# Patient Record
Sex: MALE | Race: WHITE | NOT HISPANIC OR LATINO | Employment: OTHER | URBAN - METROPOLITAN AREA
[De-identification: names, ages, dates, MRNs, and addresses within clinical notes are randomized per-mention and may not be internally consistent; named-entity substitution may affect disease eponyms.]

---

## 2019-07-27 ENCOUNTER — HOSPITAL ENCOUNTER (OUTPATIENT)
Facility: HOSPITAL | Age: 69
Discharge: HOME | DRG: 493 | End: 2019-07-28
Attending: EMERGENCY MEDICINE | Admitting: INTERNAL MEDICINE
Payer: COMMERCIAL

## 2019-07-27 ENCOUNTER — ANESTHESIA (OUTPATIENT)
Dept: SURGERY | Facility: HOSPITAL | Age: 69
DRG: 493 | End: 2019-07-27
Payer: COMMERCIAL

## 2019-07-27 ENCOUNTER — ANESTHESIA EVENT (OUTPATIENT)
Dept: SURGERY | Facility: HOSPITAL | Age: 69
DRG: 493 | End: 2019-07-27
Payer: COMMERCIAL
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
## 2019-07-27 DIAGNOSIS — M25.529 ELBOW PAIN: ICD-10-CM

## 2019-07-27 DIAGNOSIS — R00.0 TACHYCARDIA: ICD-10-CM

## 2019-07-27 DIAGNOSIS — Z01.818 PREOPERATIVE CLEARANCE: ICD-10-CM

## 2019-07-27 DIAGNOSIS — S51.002A OPEN DISLOCATION OF LEFT ELBOW, INITIAL ENCOUNTER: Primary | ICD-10-CM

## 2019-07-27 DIAGNOSIS — Z95.0 PACEMAKER: ICD-10-CM

## 2019-07-27 DIAGNOSIS — S53.105A OPEN DISLOCATION OF LEFT ELBOW, INITIAL ENCOUNTER: Primary | ICD-10-CM

## 2019-07-27 PROBLEM — I10 ESSENTIAL HYPERTENSION: Status: ACTIVE | Noted: 2019-07-27

## 2019-07-27 PROBLEM — I25.10 CAD (CORONARY ARTERY DISEASE): Status: ACTIVE | Noted: 2019-07-27

## 2019-07-27 PROBLEM — I48.0 PAF (PAROXYSMAL ATRIAL FIBRILLATION): Status: ACTIVE | Noted: 2019-07-27

## 2019-07-27 LAB
ABO + RH BLD: NORMAL
ALBUMIN SERPL BCP-MCNC: 3.7 G/DL (ref 3.5–5.2)
ALP SERPL-CCNC: 47 U/L (ref 55–135)
ALT SERPL W/O P-5'-P-CCNC: 20 U/L (ref 10–44)
ANION GAP SERPL CALC-SCNC: 9 MMOL/L (ref 8–16)
APTT BLDCRRT: 27.2 SEC (ref 21–32)
AST SERPL-CCNC: 25 U/L (ref 10–40)
BASOPHILS # BLD AUTO: 0.03 K/UL (ref 0–0.2)
BASOPHILS NFR BLD: 0.4 % (ref 0–1.9)
BILIRUB SERPL-MCNC: 0.9 MG/DL (ref 0.1–1)
BLD GP AB SCN CELLS X3 SERPL QL: NORMAL
BUN SERPL-MCNC: 14 MG/DL (ref 8–23)
CALCIUM SERPL-MCNC: 9.3 MG/DL (ref 8.7–10.5)
CHLORIDE SERPL-SCNC: 106 MMOL/L (ref 95–110)
CO2 SERPL-SCNC: 26 MMOL/L (ref 23–29)
CREAT SERPL-MCNC: 1.1 MG/DL (ref 0.5–1.4)
DIFFERENTIAL METHOD: ABNORMAL
EOSINOPHIL # BLD AUTO: 0.1 K/UL (ref 0–0.5)
EOSINOPHIL NFR BLD: 1.5 % (ref 0–8)
ERYTHROCYTE [DISTWIDTH] IN BLOOD BY AUTOMATED COUNT: 13.7 % (ref 11.5–14.5)
EST. GFR  (AFRICAN AMERICAN): >60 ML/MIN/1.73 M^2
EST. GFR  (NON AFRICAN AMERICAN): >60 ML/MIN/1.73 M^2
GLUCOSE SERPL-MCNC: 124 MG/DL (ref 70–110)
HCT VFR BLD AUTO: 37.8 % (ref 40–54)
HCV AB SERPL QL IA: NEGATIVE
HGB BLD-MCNC: 12.7 G/DL (ref 14–18)
INR PPP: 1 (ref 0.8–1.2)
LYMPHOCYTES # BLD AUTO: 1.2 K/UL (ref 1–4.8)
LYMPHOCYTES NFR BLD: 14.2 % (ref 18–48)
MCH RBC QN AUTO: 31.1 PG (ref 27–31)
MCHC RBC AUTO-ENTMCNC: 33.6 G/DL (ref 32–36)
MCV RBC AUTO: 93 FL (ref 82–98)
MONOCYTES # BLD AUTO: 0.5 K/UL (ref 0.3–1)
MONOCYTES NFR BLD: 6.4 % (ref 4–15)
NEUTROPHILS # BLD AUTO: 6.3 K/UL (ref 1.8–7.7)
NEUTROPHILS NFR BLD: 77.7 % (ref 38–73)
PLATELET # BLD AUTO: 179 K/UL (ref 150–350)
PMV BLD AUTO: 10.9 FL (ref 9.2–12.9)
POTASSIUM SERPL-SCNC: 3.8 MMOL/L (ref 3.5–5.1)
PROT SERPL-MCNC: 6.2 G/DL (ref 6–8.4)
PROTHROMBIN TIME: 10.7 SEC (ref 9–12.5)
RBC # BLD AUTO: 4.08 M/UL (ref 4.6–6.2)
SODIUM SERPL-SCNC: 141 MMOL/L (ref 136–145)
WBC # BLD AUTO: 8.1 K/UL (ref 3.9–12.7)

## 2019-07-27 PROCEDURE — 86901 BLOOD TYPING SEROLOGIC RH(D): CPT

## 2019-07-27 PROCEDURE — 99285 EMERGENCY DEPT VISIT HI MDM: CPT | Mod: 25

## 2019-07-27 PROCEDURE — 93010 EKG 12-LEAD: ICD-10-PCS | Mod: ,,, | Performed by: INTERNAL MEDICINE

## 2019-07-27 PROCEDURE — 36415 COLL VENOUS BLD VENIPUNCTURE: CPT

## 2019-07-27 PROCEDURE — 25000003 PHARM REV CODE 250: Performed by: ORTHOPAEDIC SURGERY

## 2019-07-27 PROCEDURE — 85025 COMPLETE CBC W/AUTO DIFF WBC: CPT

## 2019-07-27 PROCEDURE — 93010 ELECTROCARDIOGRAM REPORT: CPT | Mod: ,,, | Performed by: INTERNAL MEDICINE

## 2019-07-27 PROCEDURE — 25000003 PHARM REV CODE 250: Performed by: ANESTHESIOLOGY

## 2019-07-27 PROCEDURE — 85730 THROMBOPLASTIN TIME PARTIAL: CPT

## 2019-07-27 PROCEDURE — 71000033 HC RECOVERY, INTIAL HOUR: Performed by: ORTHOPAEDIC SURGERY

## 2019-07-27 PROCEDURE — 96375 TX/PRO/DX INJ NEW DRUG ADDON: CPT

## 2019-07-27 PROCEDURE — 25000003 PHARM REV CODE 250: Performed by: NURSE PRACTITIONER

## 2019-07-27 PROCEDURE — 63600175 PHARM REV CODE 636 W HCPCS: Performed by: ORTHOPAEDIC SURGERY

## 2019-07-27 PROCEDURE — 63600175 PHARM REV CODE 636 W HCPCS: Performed by: NURSE ANESTHETIST, CERTIFIED REGISTERED

## 2019-07-27 PROCEDURE — 85610 PROTHROMBIN TIME: CPT

## 2019-07-27 PROCEDURE — 96374 THER/PROPH/DIAG INJ IV PUSH: CPT

## 2019-07-27 PROCEDURE — 63600175 PHARM REV CODE 636 W HCPCS: Performed by: EMERGENCY MEDICINE

## 2019-07-27 PROCEDURE — 80053 COMPREHEN METABOLIC PANEL: CPT

## 2019-07-27 PROCEDURE — 37000009 HC ANESTHESIA EA ADD 15 MINS: Performed by: ORTHOPAEDIC SURGERY

## 2019-07-27 PROCEDURE — 11000001 HC ACUTE MED/SURG PRIVATE ROOM

## 2019-07-27 PROCEDURE — 21400001 HC TELEMETRY ROOM

## 2019-07-27 PROCEDURE — 96376 TX/PRO/DX INJ SAME DRUG ADON: CPT

## 2019-07-27 PROCEDURE — 96372 THER/PROPH/DIAG INJ SC/IM: CPT | Mod: 59

## 2019-07-27 PROCEDURE — 37000008 HC ANESTHESIA 1ST 15 MINUTES: Performed by: ORTHOPAEDIC SURGERY

## 2019-07-27 PROCEDURE — 36000707: Performed by: ORTHOPAEDIC SURGERY

## 2019-07-27 PROCEDURE — 87040 BLOOD CULTURE FOR BACTERIA: CPT | Mod: 59

## 2019-07-27 PROCEDURE — 36000706: Performed by: ORTHOPAEDIC SURGERY

## 2019-07-27 PROCEDURE — 27201423 OPTIME MED/SURG SUP & DEVICES STERILE SUPPLY: Performed by: ORTHOPAEDIC SURGERY

## 2019-07-27 PROCEDURE — 93005 ELECTROCARDIOGRAM TRACING: CPT

## 2019-07-27 PROCEDURE — 25000003 PHARM REV CODE 250: Performed by: INTERNAL MEDICINE

## 2019-07-27 PROCEDURE — 63600175 PHARM REV CODE 636 W HCPCS: Performed by: ANESTHESIOLOGY

## 2019-07-27 PROCEDURE — 86803 HEPATITIS C AB TEST: CPT

## 2019-07-27 RX ORDER — POTASSIUM CHLORIDE 1.5 G/1.58G
20 POWDER, FOR SOLUTION ORAL ONCE
Status: ON HOLD | COMMUNITY
End: 2019-07-27

## 2019-07-27 RX ORDER — HYDROMORPHONE HYDROCHLORIDE 2 MG/ML
0.2 INJECTION, SOLUTION INTRAMUSCULAR; INTRAVENOUS; SUBCUTANEOUS EVERY 5 MIN PRN
Status: DISCONTINUED | OUTPATIENT
Start: 2019-07-27 | End: 2019-07-27 | Stop reason: HOSPADM

## 2019-07-27 RX ORDER — ONDANSETRON 2 MG/ML
4 INJECTION INTRAMUSCULAR; INTRAVENOUS EVERY 12 HOURS PRN
Status: DISCONTINUED | OUTPATIENT
Start: 2019-07-27 | End: 2019-07-28 | Stop reason: HOSPADM

## 2019-07-27 RX ORDER — FOLIC ACID 1 MG/1
1 TABLET ORAL DAILY
Status: DISCONTINUED | OUTPATIENT
Start: 2019-07-28 | End: 2019-07-28 | Stop reason: HOSPADM

## 2019-07-27 RX ORDER — FUROSEMIDE 40 MG/1
40 TABLET ORAL 2 TIMES DAILY
COMMUNITY

## 2019-07-27 RX ORDER — ASPIRIN 81 MG/1
81 TABLET ORAL DAILY
COMMUNITY

## 2019-07-27 RX ORDER — SODIUM CHLORIDE 0.9 % (FLUSH) 0.9 %
10 SYRINGE (ML) INJECTION
Status: DISCONTINUED | OUTPATIENT
Start: 2019-07-27 | End: 2019-07-27

## 2019-07-27 RX ORDER — FENTANYL CITRATE 50 UG/ML
25 INJECTION, SOLUTION INTRAMUSCULAR; INTRAVENOUS EVERY 5 MIN PRN
Status: DISCONTINUED | OUTPATIENT
Start: 2019-07-27 | End: 2019-07-27 | Stop reason: HOSPADM

## 2019-07-27 RX ORDER — ONDANSETRON 2 MG/ML
INJECTION INTRAMUSCULAR; INTRAVENOUS
Status: DISCONTINUED | OUTPATIENT
Start: 2019-07-27 | End: 2019-07-27

## 2019-07-27 RX ORDER — CARVEDILOL 12.5 MG/1
25 TABLET ORAL 2 TIMES DAILY WITH MEALS
Status: DISCONTINUED | OUTPATIENT
Start: 2019-07-27 | End: 2019-07-28 | Stop reason: HOSPADM

## 2019-07-27 RX ORDER — MEPERIDINE HYDROCHLORIDE 50 MG/ML
12.5 INJECTION INTRAMUSCULAR; INTRAVENOUS; SUBCUTANEOUS 2 TIMES DAILY PRN
Status: DISCONTINUED | OUTPATIENT
Start: 2019-07-27 | End: 2019-07-27 | Stop reason: HOSPADM

## 2019-07-27 RX ORDER — PRAVASTATIN SODIUM 20 MG/1
80 TABLET ORAL DAILY
Status: DISCONTINUED | OUTPATIENT
Start: 2019-07-28 | End: 2019-07-28 | Stop reason: HOSPADM

## 2019-07-27 RX ORDER — RAMIPRIL 10 MG/1
10 CAPSULE ORAL DAILY
Status: ON HOLD | COMMUNITY
End: 2023-08-02

## 2019-07-27 RX ORDER — GABAPENTIN 800 MG/1
800 TABLET ORAL 3 TIMES DAILY
COMMUNITY
Start: 2019-06-24

## 2019-07-27 RX ORDER — AMIODARONE HYDROCHLORIDE 200 MG/1
TABLET ORAL DAILY
COMMUNITY

## 2019-07-27 RX ORDER — TRAMADOL HYDROCHLORIDE 50 MG/1
50 TABLET ORAL EVERY 4 HOURS PRN
Status: DISCONTINUED | OUTPATIENT
Start: 2019-07-27 | End: 2019-07-28 | Stop reason: HOSPADM

## 2019-07-27 RX ORDER — FENTANYL CITRATE 50 UG/ML
50 INJECTION, SOLUTION INTRAMUSCULAR; INTRAVENOUS
Status: COMPLETED | OUTPATIENT
Start: 2019-07-27 | End: 2019-07-27

## 2019-07-27 RX ORDER — SODIUM CHLORIDE 0.9 % (FLUSH) 0.9 %
10 SYRINGE (ML) INJECTION
Status: DISCONTINUED | OUTPATIENT
Start: 2019-07-27 | End: 2019-07-28 | Stop reason: HOSPADM

## 2019-07-27 RX ORDER — AMIODARONE HYDROCHLORIDE 200 MG/1
200 TABLET ORAL DAILY
Status: DISCONTINUED | OUTPATIENT
Start: 2019-07-28 | End: 2019-07-27

## 2019-07-27 RX ORDER — CHLORHEXIDINE GLUCONATE ORAL RINSE 1.2 MG/ML
10 SOLUTION DENTAL
Status: DISCONTINUED | OUTPATIENT
Start: 2019-07-27 | End: 2019-07-27 | Stop reason: HOSPADM

## 2019-07-27 RX ORDER — PROPOFOL 10 MG/ML
INJECTION, EMULSION INTRAVENOUS
Status: DISCONTINUED | OUTPATIENT
Start: 2019-07-27 | End: 2019-07-27

## 2019-07-27 RX ORDER — RAMIPRIL 5 MG/1
10 CAPSULE ORAL DAILY
Status: DISCONTINUED | OUTPATIENT
Start: 2019-07-28 | End: 2019-07-28 | Stop reason: HOSPADM

## 2019-07-27 RX ORDER — PANTOPRAZOLE SODIUM 40 MG/1
40 TABLET, DELAYED RELEASE ORAL DAILY
COMMUNITY

## 2019-07-27 RX ORDER — FUROSEMIDE 40 MG/1
40 TABLET ORAL 2 TIMES DAILY
Status: DISCONTINUED | OUTPATIENT
Start: 2019-07-27 | End: 2019-07-28 | Stop reason: HOSPADM

## 2019-07-27 RX ORDER — CARVEDILOL 25 MG/1
25 TABLET ORAL 2 TIMES DAILY WITH MEALS
COMMUNITY

## 2019-07-27 RX ORDER — FOLIC ACID 1 MG/1
1 TABLET ORAL DAILY
COMMUNITY

## 2019-07-27 RX ORDER — DIPHENHYDRAMINE HYDROCHLORIDE 50 MG/ML
25 INJECTION INTRAMUSCULAR; INTRAVENOUS EVERY 6 HOURS PRN
Status: DISCONTINUED | OUTPATIENT
Start: 2019-07-27 | End: 2019-07-27 | Stop reason: HOSPADM

## 2019-07-27 RX ORDER — AMIODARONE HYDROCHLORIDE 200 MG/1
200 TABLET ORAL NIGHTLY
Status: DISCONTINUED | OUTPATIENT
Start: 2019-07-27 | End: 2019-07-28 | Stop reason: HOSPADM

## 2019-07-27 RX ORDER — CHLORHEXIDINE GLUCONATE ORAL RINSE 1.2 MG/ML
10 SOLUTION DENTAL 2 TIMES DAILY
Status: DISCONTINUED | OUTPATIENT
Start: 2019-07-27 | End: 2019-07-28 | Stop reason: HOSPADM

## 2019-07-27 RX ORDER — HYDROCODONE BITARTRATE AND ACETAMINOPHEN 5; 325 MG/1; MG/1
1 TABLET ORAL EVERY 4 HOURS PRN
Status: DISCONTINUED | OUTPATIENT
Start: 2019-07-27 | End: 2019-07-28 | Stop reason: HOSPADM

## 2019-07-27 RX ORDER — OXYCODONE HYDROCHLORIDE 5 MG/1
10 TABLET ORAL EVERY 4 HOURS PRN
Status: DISCONTINUED | OUTPATIENT
Start: 2019-07-27 | End: 2019-07-28 | Stop reason: HOSPADM

## 2019-07-27 RX ORDER — PRAVASTATIN SODIUM 80 MG/1
80 TABLET ORAL DAILY
COMMUNITY

## 2019-07-27 RX ORDER — ASPIRIN 81 MG/1
81 TABLET ORAL DAILY
Status: DISCONTINUED | OUTPATIENT
Start: 2019-07-28 | End: 2019-07-28 | Stop reason: HOSPADM

## 2019-07-27 RX ORDER — SODIUM CHLORIDE 9 MG/ML
INJECTION, SOLUTION INTRAVENOUS CONTINUOUS
Status: DISCONTINUED | OUTPATIENT
Start: 2019-07-27 | End: 2019-07-28 | Stop reason: HOSPADM

## 2019-07-27 RX ORDER — FENTANYL CITRATE 50 UG/ML
75 INJECTION, SOLUTION INTRAMUSCULAR; INTRAVENOUS ONCE
Status: COMPLETED | OUTPATIENT
Start: 2019-07-27 | End: 2019-07-27

## 2019-07-27 RX ORDER — PANTOPRAZOLE SODIUM 40 MG/1
40 TABLET, DELAYED RELEASE ORAL DAILY
Status: DISCONTINUED | OUTPATIENT
Start: 2019-07-28 | End: 2019-07-28 | Stop reason: HOSPADM

## 2019-07-27 RX ORDER — FENTANYL CITRATE 50 UG/ML
75 INJECTION, SOLUTION INTRAMUSCULAR; INTRAVENOUS
Status: COMPLETED | OUTPATIENT
Start: 2019-07-27 | End: 2019-07-27

## 2019-07-27 RX ORDER — OXYCODONE AND ACETAMINOPHEN 5; 325 MG/1; MG/1
1 TABLET ORAL
Status: DISCONTINUED | OUTPATIENT
Start: 2019-07-27 | End: 2019-07-27 | Stop reason: HOSPADM

## 2019-07-27 RX ORDER — NEOMYCIN AND POLYMYXIN B SULFATES 40; 200000 MG/ML; [USP'U]/ML
SOLUTION IRRIGATION
Status: DISCONTINUED | OUTPATIENT
Start: 2019-07-27 | End: 2019-07-27 | Stop reason: HOSPADM

## 2019-07-27 RX ORDER — GABAPENTIN 400 MG/1
800 CAPSULE ORAL 3 TIMES DAILY
Status: DISCONTINUED | OUTPATIENT
Start: 2019-07-27 | End: 2019-07-28 | Stop reason: HOSPADM

## 2019-07-27 RX ORDER — CEFAZOLIN SODIUM 1 G/3ML
1 INJECTION, POWDER, FOR SOLUTION INTRAMUSCULAR; INTRAVENOUS
Status: COMPLETED | OUTPATIENT
Start: 2019-07-27 | End: 2019-07-27

## 2019-07-27 RX ORDER — FENTANYL CITRATE 50 UG/ML
INJECTION, SOLUTION INTRAMUSCULAR; INTRAVENOUS
Status: DISCONTINUED | OUTPATIENT
Start: 2019-07-27 | End: 2019-07-27

## 2019-07-27 RX ORDER — POTASSIUM CHLORIDE 20 MEQ/1
20 TABLET, EXTENDED RELEASE ORAL DAILY
Status: DISCONTINUED | OUTPATIENT
Start: 2019-07-27 | End: 2019-07-28 | Stop reason: HOSPADM

## 2019-07-27 RX ORDER — SODIUM CHLORIDE, SODIUM LACTATE, POTASSIUM CHLORIDE, CALCIUM CHLORIDE 600; 310; 30; 20 MG/100ML; MG/100ML; MG/100ML; MG/100ML
INJECTION, SOLUTION INTRAVENOUS CONTINUOUS PRN
Status: DISCONTINUED | OUTPATIENT
Start: 2019-07-27 | End: 2019-07-27

## 2019-07-27 RX ORDER — ONDANSETRON 2 MG/ML
4 INJECTION INTRAMUSCULAR; INTRAVENOUS ONCE
Status: COMPLETED | OUTPATIENT
Start: 2019-07-27 | End: 2019-07-27

## 2019-07-27 RX ADMIN — FENTANYL CITRATE 150 MCG: 50 INJECTION, SOLUTION INTRAMUSCULAR; INTRAVENOUS at 02:07

## 2019-07-27 RX ADMIN — OXYCODONE HYDROCHLORIDE 10 MG: 5 TABLET ORAL at 08:07

## 2019-07-27 RX ADMIN — ONDANSETRON 4 MG: 2 INJECTION INTRAMUSCULAR; INTRAVENOUS at 11:07

## 2019-07-27 RX ADMIN — FUROSEMIDE 40 MG: 40 TABLET ORAL at 06:07

## 2019-07-27 RX ADMIN — HYDROMORPHONE HYDROCHLORIDE 0.2 MG: 2 INJECTION, SOLUTION INTRAMUSCULAR; INTRAVENOUS; SUBCUTANEOUS at 04:07

## 2019-07-27 RX ADMIN — FENTANYL CITRATE 100 MCG: 50 INJECTION, SOLUTION INTRAMUSCULAR; INTRAVENOUS at 02:07

## 2019-07-27 RX ADMIN — POTASSIUM CHLORIDE 20 MEQ: 1500 TABLET, EXTENDED RELEASE ORAL at 06:07

## 2019-07-27 RX ADMIN — ONDANSETRON 4 MG: 2 INJECTION, SOLUTION INTRAMUSCULAR; INTRAVENOUS at 02:07

## 2019-07-27 RX ADMIN — GABAPENTIN 800 MG: 400 CAPSULE ORAL at 08:07

## 2019-07-27 RX ADMIN — OXYCODONE AND ACETAMINOPHEN 1 TABLET: 5; 325 TABLET ORAL at 04:07

## 2019-07-27 RX ADMIN — PROPOFOL 120 MG: 10 INJECTION, EMULSION INTRAVENOUS at 02:07

## 2019-07-27 RX ADMIN — CEFAZOLIN 1 G: 1 INJECTION, POWDER, FOR SOLUTION INTRAMUSCULAR; INTRAVENOUS at 12:07

## 2019-07-27 RX ADMIN — FENTANYL CITRATE 75 MCG: 0.05 INJECTION, SOLUTION INTRAMUSCULAR; INTRAVENOUS at 12:07

## 2019-07-27 RX ADMIN — CARVEDILOL 25 MG: 12.5 TABLET, FILM COATED ORAL at 06:07

## 2019-07-27 RX ADMIN — AMIODARONE HYDROCHLORIDE 200 MG: 200 TABLET ORAL at 06:07

## 2019-07-27 RX ADMIN — CHLORHEXIDINE GLUCONATE 0.12% ORAL RINSE 10 ML: 1.2 LIQUID ORAL at 08:07

## 2019-07-27 RX ADMIN — FENTANYL CITRATE 50 MCG: 0.05 INJECTION, SOLUTION INTRAMUSCULAR; INTRAVENOUS at 11:07

## 2019-07-27 RX ADMIN — SODIUM CHLORIDE, SODIUM LACTATE, POTASSIUM CHLORIDE, AND CALCIUM CHLORIDE: 600; 310; 30; 20 INJECTION, SOLUTION INTRAVENOUS at 03:07

## 2019-07-27 RX ADMIN — FENTANYL CITRATE 75 MCG: 0.05 INJECTION, SOLUTION INTRAMUSCULAR; INTRAVENOUS at 01:07

## 2019-07-27 RX ADMIN — SODIUM CHLORIDE: 0.9 INJECTION, SOLUTION INTRAVENOUS at 05:07

## 2019-07-27 RX ADMIN — SODIUM CHLORIDE, SODIUM LACTATE, POTASSIUM CHLORIDE, AND CALCIUM CHLORIDE: 600; 310; 30; 20 INJECTION, SOLUTION INTRAVENOUS at 02:07

## 2019-07-27 NOTE — SUBJECTIVE & OBJECTIVE
Past Medical History:   Diagnosis Date    Crohn's disease     Hypertension     Sarcoidosis        Past Surgical History:   Procedure Laterality Date    CARDIAC SURGERY         Review of patient's allergies indicates:   Allergen Reactions    Infliximab Anaphylaxis    Iodine and iodide containing products Rash     Patient states severe rash similar to a sunburn experienced after CT contrast. Patient with prior history of pre-meds before any contrast studies.      Mercaptopurine analogues (thiopurines)        No current facility-administered medications for this encounter.      Current Outpatient Medications   Medication Sig    amiodarone (PACERONE) 200 MG Tab Take by mouth once daily.    apixaban (ELIQUIS) 5 mg Tab Take 5 mg by mouth 2 (two) times daily.    aspirin (ECOTRIN) 81 MG EC tablet Take 81 mg by mouth once daily.    carvedilol (COREG) 25 MG tablet Take 25 mg by mouth 2 (two) times daily with meals.    folic acid (FOLVITE) 1 MG tablet Take 1 mg by mouth once daily.    furosemide (LASIX) 40 MG tablet Take 40 mg by mouth 2 (two) times daily.    gabapentin (NEURONTIN) 800 MG tablet Take 800 mg by mouth 3 (three) times daily.    pantoprazole (PROTONIX) 40 MG tablet Take 40 mg by mouth once daily.    potassium chloride (KLOR-CON) 20 mEq Pack Take 20 mEq by mouth once.    pravastatin (PRAVACHOL) 80 MG tablet Take 80 mg by mouth once daily.    ramipril (ALTACE) 10 MG capsule Take 10 mg by mouth once daily.    vitamin B comp and vit C no.6 (VITAMIN B COMP WITH VIT C NO.6 ORAL) Take by mouth.     Family History     None        Tobacco Use    Smoking status: Former Smoker    Smokeless tobacco: Never Used   Substance and Sexual Activity    Alcohol use: Not Currently    Drug use: Never    Sexual activity: Not on file     Review of Systems   Constitution: Negative.   HENT: Negative.    Eyes: Negative.    Cardiovascular: Positive for irregular heartbeat.        Patient has a history of arrhythmia and  a pacemaker.   Respiratory: Negative.    Endocrine: Negative.    Hematologic/Lymphatic: Negative.         Patient is on anticoagulation.   Skin: Negative.    Musculoskeletal:        Negative prior to today's injury.   Gastrointestinal: Negative.    Neurological: Negative.    Psychiatric/Behavioral: Negative.    Allergic/Immunologic: Negative.      Objective:     Vital Signs (Most Recent):  Temp: 97.7 °F (36.5 °C) (07/27/19 1108)  Pulse: 67 (07/27/19 1246)  Resp: 16 (07/27/19 1150)  BP: 137/77 (07/27/19 1246)  SpO2: 99 % (07/27/19 1150) Vital Signs (24h Range):  Temp:  [97.7 °F (36.5 °C)] 97.7 °F (36.5 °C)  Pulse:  [61-67] 67  Resp:  [15-16] 16  SpO2:  [99 %] 99 %  BP: (118-137)/(69-90) 137/77     Weight: 95.3 kg (210 lb)  Height: 6' (182.9 cm)  Body mass index is 28.48 kg/m².    No intake or output data in the 24 hours ending 07/27/19 1314    General    Nursing note and vitals reviewed.  Constitutional: He is oriented to person, place, and time. He appears well-developed and well-nourished. No distress.   HENT:   Head: Normocephalic and atraumatic.   Eyes: EOM are normal. Pupils are equal, round, and reactive to light.   Neck: Normal range of motion. No tracheal deviation present.   Cardiovascular: Normal rate and intact distal pulses.    Pulmonary/Chest: Effort normal. No respiratory distress. He exhibits no tenderness.   Abdominal: Soft. He exhibits no distension. There is no tenderness. There is no rebound.   Neurological: He is alert and oriented to person, place, and time.   Psychiatric: He has a normal mood and affect. His behavior is normal. Judgment and thought content normal.         Left Hand/Wrist Exam     Other     Sensory Exam  Median Distribution: normal  Ulnar Distribution: normal  Radial Distribution: normal      Right Elbow Exam   Right elbow exam is normal.      Left Elbow Exam     Inspection   Scars: absent  Bruising: present  Deformity: present  Atrophy: absent    Pain   The patient exhibits pain  of the anterior joint line, lateral epicondyle, medial epicondyle and olecranon    Tenderness   The patient is tender to palpation of the lateral epicondyle, medial epicondyle, olecranon fossa, radial head and radial capitellar joint.     Range of Motion   Extension: abnormal   Flexion: abnormal   Pronation: abnormal   Supination: abnormal     Other   Sensation: normal    Comments:  Gross deformity with a medial laceration of the left elbow is noted at this time. Exposed osseous structures are also noted.  The neurologic exam of the left upper extremity demonstrates that the median, radial and ulnar nerve remained intact.  The posterior and anterior interosseous nerves also appear intact. Pulses are brisk and capillary refill is normal.        Muscle Strength   Left Upper Extremity  Wrist extension: 5/5/5   Wrist flexion: 5/5/5   :  4/5/5   Pinch Mechanism: 5/5  Intrinsics: 5/5  EPL (Extensor Pollicis Longus): 5/5    Vascular Exam       Left Pulses      Radial:                    2+      Capillary Refill  Left Hand: normal capillary refill    Edema  Left Forearm: absent      Significant Labs:   CBC:   Recent Labs   Lab 07/27/19  1156   WBC 8.10   HGB 12.7*   HCT 37.8*        All pertinent labs within the past 24 hours have been reviewed.    Significant Imaging: X-Ray: I have reviewed all pertinent results/findings and my personal findings are:  Dislocation of the left elbow with possible radial ulnar injury as well.

## 2019-07-27 NOTE — HPI
This is a 68-year-old male who sustained a fall today injuring his left upper extremity.  He was evaluated in the emergency room for extreme left elbow pain.  It is unremitting.  There is an open wound with active bleeding.  The patient has been stabilized in the emergency room and preliminary x-rays demonstrate a dislocation of his left elbow.  Due to the open wound and exposed bone this is an open dislocation possibly an open fracture dislocation.  The patient has been evaluated in the emergency room by the emergency room physician and myself and the urgent nature of the injury has been explained.  Patient is recommended operative intervention.

## 2019-07-27 NOTE — H&P
[facility] Hospital Medicine  History & Physical    Patient Name: Memo Lew  MRN: [id number]  Admission Date: 7/27/2019  Attending Physician: Odalis Jonas MD   Primary Care Provider: SAJAN Santana       Patient information was obtained from past medical records and ER records.     Subjective:     Principal Problem:Open dislocation of left elbow    Chief Complaint:   Chief Complaint   Patient presents with    Fall     fell from standing level, L open elbow fx, wrist rotated, squirting blood when sitting up, pt on Plavix        HPI: Memo Lew is a 68 year old male with history of essential hypertension, sarcoidosis, and crohn's disease who presented to ED with left elbow open fracture following a mechanical fall. He emergently went surgery for I&D, elbow ORIF, and ulnar nerve decompression performed by Dr. Clark. Surgery without acute complication. Hospital medicine has been consulted for admission. Patient is awake, but groggy. Complains of left arm heaviness postoperatively. Currently on supplemental oxygen. Oxygen saturation is stable.       Past Medical History:   Diagnosis Date    Crohn's disease     Hypertension     Sarcoidosis        Past Surgical History:   Procedure Laterality Date    CARDIAC PACEMAKER PLACEMENT      CARDIAC SURGERY         Review of patient's allergies indicates:   Allergen Reactions    Infliximab Anaphylaxis    Iodine and iodide containing products Rash     Patient states severe rash similar to a sunburn experienced after CT contrast. Patient with prior history of pre-meds before any contrast studies.      Mercaptopurine analogues (thiopurines)        No current facility-administered medications on file prior to encounter.      Current Outpatient Medications on File Prior to Encounter   Medication Sig    amiodarone (PACERONE) 200 MG Tab Take by mouth once daily.    apixaban (ELIQUIS) 5 mg Tab Take 5 mg by mouth 2 (two) times daily.    aspirin  (ECOTRIN) 81 MG EC tablet Take 81 mg by mouth once daily.    carvedilol (COREG) 25 MG tablet Take 25 mg by mouth 2 (two) times daily with meals.    folic acid (FOLVITE) 1 MG tablet Take 1 mg by mouth once daily.    furosemide (LASIX) 40 MG tablet Take 40 mg by mouth 2 (two) times daily.    gabapentin (NEURONTIN) 800 MG tablet Take 800 mg by mouth 3 (three) times daily.    pantoprazole (PROTONIX) 40 MG tablet Take 40 mg by mouth once daily.    pravastatin (PRAVACHOL) 80 MG tablet Take 80 mg by mouth once daily.    ramipril (ALTACE) 10 MG capsule Take 10 mg by mouth once daily.    vitamin B comp and vit C no.6 (VITAMIN B COMP WITH VIT C NO.6 ORAL) Take by mouth.    [DISCONTINUED] potassium chloride (KLOR-CON) 20 mEq Pack Take 20 mEq by mouth once.     Family History     None        Tobacco Use    Smoking status: Former Smoker    Smokeless tobacco: Never Used   Substance and Sexual Activity    Alcohol use: Not Currently    Drug use: Never    Sexual activity: Not on file     Review of Systems   Unable to perform ROS: Other   s/p surgery  Objective:     Vital Signs (Most Recent):  Temp: 97.2 °F (36.2 °C) (07/27/19 1608)  Pulse: 61 (07/27/19 1630)  Resp: 18 (07/27/19 1630)  BP: (!) 170/92 (07/27/19 1630)  SpO2: 100 % (07/27/19 1630) Vital Signs (24h Range):  Temp:  [97.2 °F (36.2 °C)-97.7 °F (36.5 °C)] 97.2 °F (36.2 °C)  Pulse:  [60-67] 61  Resp:  [15-22] 18  SpO2:  [95 %-100 %] 100 %  BP: (118-170)/(69-92) 170/92     Weight: 95.3 kg (210 lb)  Body mass index is 28.48 kg/m².    Physical Exam   Constitutional: He is oriented to person, place, and time. He appears well-developed and well-nourished. No distress.   HENT:   Head: Normocephalic and atraumatic.   Eyes: EOM are normal.   Neck: Normal range of motion. Neck supple.   Cardiovascular: Normal rate, regular rhythm and normal heart sounds.   Pulmonary/Chest: Effort normal and breath sounds normal. No respiratory distress.   Abdominal: Soft. Bowel sounds  are normal. He exhibits no distension. There is no tenderness.   Musculoskeletal: Normal range of motion. He exhibits no edema.   Left arm in ACE wrap and sling. Able to open and close fingers on command.    Neurological: He is alert and oriented to person, place, and time.   Groggy postoperatively   Skin: Skin is dry.   Nursing note and vitals reviewed.        CRANIAL NERVES     CN III, IV, VI   Extraocular motions are normal.        Significant Labs:   CBC:   Recent Labs   Lab 07/27/19  1156   WBC 8.10   HGB 12.7*   HCT 37.8*        CMP:   Recent Labs   Lab 07/27/19  1156      K 3.8      CO2 26   *   BUN 14   CREATININE 1.1   CALCIUM 9.3   PROT 6.2   ALBUMIN 3.7   BILITOT 0.9   ALKPHOS 47*   AST 25   ALT 20   ANIONGAP 9   EGFRNONAA >60       Significant Imaging:   Imaging Results          X-Ray Chest AP Portable (Final result)  Result time 07/27/19 13:54:04    Final result by James Lubin MD (07/27/19 13:54:04)                 Impression:      1.  Minimal scarring or atelectasis in the left mid lung laterally.  Lungs are otherwise clear.    2.  Incidental findings as noted above.      Electronically signed by: James Lubin MD  Date:    07/27/2019  Time:    13:54             Narrative:    EXAMINATION:  XR CHEST AP PORTABLE    CLINICAL HISTORY:  Encounter for other preprocedural examination    COMPARISON:  No comparison studies are available.    FINDINGS:  The study is lordotic in position and slightly rotated to the left.  Minimal scarring or atelectasis in the lateral left mid lung.  The lungs are otherwise clear.  The cardiac silhouette size is normal. The trachea is midline and the mediastinal width is normal. Negative for focal infiltrate, effusion or pneumothorax. Pulmonary vasculature is normal. Negative for osseous abnormalities. Dual lead left subclavian pacemaker.  Median sternotomy wires and CABG changes.  Convex right curvature of the thoracic spine with marginal  spondylosis.  Tortuous aorta with calcifications of the aortic knob.                               CT Head Without Contrast (Final result)  Result time 07/27/19 13:33:01    Final result by Cheikh Walsh MD (07/27/19 13:33:01)                 Impression:      Atrophy.  Intracranial atherosclerotic calcifications.  Small vessel disease.  No definite acute intracranial abnormality.  Artifacts are present from the patient's arm position..    All CT scans at this facility use dose modulation, iterative reconstruction and/or weight based dosing when appropriate to reduce radiation dose to as low as reasonably achievable.      Electronically signed by: Cheikh Walsh MD  Date:    07/27/2019  Time:    13:33             Narrative:    EXAMINATION:  CT HEAD WITHOUT CONTRAST    CLINICAL HISTORY:  Unspecified fall, initial encounterHead trauma, minor, GCS>=13, NOC/NEXUS/CCR positive, first study;    TECHNIQUE:  Axial CT images of the head were obtained without  contrast.    FINDINGS:  Mild diffuse cerebral and cerebellar atrophy.  Intracranial atherosclerotic calcifications.  Retention cyst or polyp in the sphenoid sinus.  No acute intracranial abnormality identified...  No intra or extraaxial masses, hemorrhages, abnormal fluid collections or abnormal calcifications..                               X-Ray Elbow 2 Views Left (Final result)  Result time 07/27/19 13:11:52   Procedure changed from X-Ray Elbow Complete Left     Final result by Cheikh Walsh MD (07/27/19 13:11:52)                 Impression:      Fracture dislocation of the elbow.  No change in position or alignment      Electronically signed by: Cheikh Walsh MD  Date:    07/27/2019  Time:    13:11             Narrative:    EXAMINATION:  XR ELBOW 2 VIEWS LEFT    CLINICAL HISTORY:  fall; Pain in unspecified elbow    TECHNIQUE:  AP, lateral, and oblique views of the left elbow were performed.    COMPARISON:  None    FINDINGS:  Fracture dislocation of the elbow.                                X-Ray Humerus 2 View Left (Final result)  Result time 07/27/19 13:11:24    Final result by Cheikh Walsh MD (07/27/19 13:11:24)                 Impression:      Fracture dislocation of the elbow joint.  No change in position or alignment when compared to the prior film      Electronically signed by: Cheikh Walsh MD  Date:    07/27/2019  Time:    13:11             Narrative:    EXAMINATION:  XR HUMERUS 2 VIEW LEFT    COMPARISON:  None    FINDINGS:  Fracture dislocation of the elbow.                                  Assessment/Plan:     * Open dislocation of left elbow  -S/p I&D, ORIF elbow, and ulnar nerve decompression performed by Dr. Clark  -continue IV abx x 24 hours  --PT/OT in AM        Essential hypertension  --resume home medications      CAD (coronary artery disease)  S/p CAD 9/2018  -resume ASA, BB, ACE, STATIN    PAF (paroxysmal atrial fibrillation)  --continue BB, Amiodarone,   --will resume Eliquis in AM        VTE Risk Mitigation (From admission, onward)        Ordered     IP VTE HIGH RISK PATIENT  Once      07/27/19 1501     Place sequential compression device  Until discontinued      07/27/19 1501     Place MARIA FERNANDA hose  Until discontinued      07/27/19 1501     Reason for No Pharmacological VTE Prophylaxis  Once      07/27/19 1501          Delaney Tucker NP  Department of Hospital Medicine   [facility]

## 2019-07-27 NOTE — ED NOTES
Pt AAOx3, resting in bed, side rails up x 2, call bell within reach. Pt c/o left elbow pain - 10/10. MD notified. Will continue to monitor.

## 2019-07-27 NOTE — TRANSFER OF CARE
Anesthesia Transfer of Care Note    Patient: Memo Lew    Procedure(s) Performed: Procedure(s) (LRB):  INCISION AND DRAINAGE (Left)  ORIF, ELBOW (Left)  DECOMPRESSION, NERVE, ULNAR (Left)    Patient location: PACU    Anesthesia Type: general    Transport from OR: Transported from OR on room air with adequate spontaneous ventilation    Post pain: adequate analgesia    Post assessment: no apparent anesthetic complications    Post vital signs: stable    Level of consciousness: awake    Nausea/Vomiting: no nausea/vomiting    Complications: none    Transfer of care protocol was followed      Last vitals:   Visit Vitals  /77 (BP Location: Right arm, Patient Position: Lying)   Pulse 67   Temp 36.5 °C (97.7 °F) (Oral)   Resp 16   Ht 6' (1.829 m)   Wt 95.3 kg (210 lb)   SpO2 99%   BMI 28.48 kg/m²

## 2019-07-27 NOTE — ED NOTES
"Pt c/o fall and left elbow pain - onset today. Pain rated 10/10. Patient states, "I tripped while changing my bird feeder." Denies hitting head or LOC.     Patient moved to ED room 6, patient assisted onto stretcher and changed into a gown. Patient placed on automatic blood pressure cuff. Bed placed in low locked position, side rails up x 2, call light is within reach of patient or family, orientation to room and explanation of wait provided to family, awaiting MD evaluation and orders, will continue to monitor.    Patient identifies self as Memo D Wascom      LOC: The patient is awake, alert and aware of environment with an appropriate affect, the patient is oriented x 3 and speaking appropriately.  APPEARANCE: Patient appears distressed related to pain, patient is clean and well groomed, patient's clothing is properly fastened.  SKIN: The skin is warm and dry, color consistent with ethnicity, patient has normal skin turgor and moist mucus membranes, left humerus erupted through skin - swelling and small amount of bleeding noted.   MUSCULOSKELETAL: Deformity of LUE - mobility impaired, moving all other extremities spontaneously.  RESPIRATORY: Airway is open and patent, respirations are spontaneous, patient has a normal effort and rate, no accessory muscle use noted.  CARDIAC: Patient has a normal rate and rhythm, no periphreal edema noted, capillary refill < 3 seconds, 2+ bilateral radial pulses present.  ABDOMEN: Soft and non tender to palpation, no distention noted.  NEUROLOGIC: PERRL, eyes open spontaneously, behavior appropriate to situation, follows commands, facial expression symmetrical, purposeful motor response noted, normal sensation in all extremities when touched with a finger.      "

## 2019-07-27 NOTE — SUBJECTIVE & OBJECTIVE
Past Medical History:   Diagnosis Date    Crohn's disease     Hypertension     Sarcoidosis        Past Surgical History:   Procedure Laterality Date    CARDIAC PACEMAKER PLACEMENT      CARDIAC SURGERY         Review of patient's allergies indicates:   Allergen Reactions    Infliximab Anaphylaxis    Iodine and iodide containing products Rash     Patient states severe rash similar to a sunburn experienced after CT contrast. Patient with prior history of pre-meds before any contrast studies.      Mercaptopurine analogues (thiopurines)        No current facility-administered medications on file prior to encounter.      Current Outpatient Medications on File Prior to Encounter   Medication Sig    amiodarone (PACERONE) 200 MG Tab Take by mouth once daily.    apixaban (ELIQUIS) 5 mg Tab Take 5 mg by mouth 2 (two) times daily.    aspirin (ECOTRIN) 81 MG EC tablet Take 81 mg by mouth once daily.    carvedilol (COREG) 25 MG tablet Take 25 mg by mouth 2 (two) times daily with meals.    folic acid (FOLVITE) 1 MG tablet Take 1 mg by mouth once daily.    furosemide (LASIX) 40 MG tablet Take 40 mg by mouth 2 (two) times daily.    gabapentin (NEURONTIN) 800 MG tablet Take 800 mg by mouth 3 (three) times daily.    pantoprazole (PROTONIX) 40 MG tablet Take 40 mg by mouth once daily.    pravastatin (PRAVACHOL) 80 MG tablet Take 80 mg by mouth once daily.    ramipril (ALTACE) 10 MG capsule Take 10 mg by mouth once daily.    vitamin B comp and vit C no.6 (VITAMIN B COMP WITH VIT C NO.6 ORAL) Take by mouth.    [DISCONTINUED] potassium chloride (KLOR-CON) 20 mEq Pack Take 20 mEq by mouth once.     Family History     None        Tobacco Use    Smoking status: Former Smoker    Smokeless tobacco: Never Used   Substance and Sexual Activity    Alcohol use: Not Currently    Drug use: Never    Sexual activity: Not on file     Review of Systems   Unable to perform ROS: Other   s/p surgery  Objective:     Vital Signs (Most  Recent):  Temp: 97.2 °F (36.2 °C) (07/27/19 1608)  Pulse: 61 (07/27/19 1630)  Resp: 18 (07/27/19 1630)  BP: (!) 170/92 (07/27/19 1630)  SpO2: 100 % (07/27/19 1630) Vital Signs (24h Range):  Temp:  [97.2 °F (36.2 °C)-97.7 °F (36.5 °C)] 97.2 °F (36.2 °C)  Pulse:  [60-67] 61  Resp:  [15-22] 18  SpO2:  [95 %-100 %] 100 %  BP: (118-170)/(69-92) 170/92     Weight: 95.3 kg (210 lb)  Body mass index is 28.48 kg/m².    Physical Exam   Constitutional: He is oriented to person, place, and time. He appears well-developed and well-nourished. No distress.   HENT:   Head: Normocephalic and atraumatic.   Eyes: EOM are normal.   Neck: Normal range of motion. Neck supple.   Cardiovascular: Normal rate, regular rhythm and normal heart sounds.   Pulmonary/Chest: Effort normal and breath sounds normal. No respiratory distress.   Abdominal: Soft. Bowel sounds are normal. He exhibits no distension. There is no tenderness.   Musculoskeletal: Normal range of motion. He exhibits no edema.   Left arm in ACE wrap and sling. Able to open and close fingers on command.    Neurological: He is alert and oriented to person, place, and time.   Groggy postoperatively   Skin: Skin is dry.   Nursing note and vitals reviewed.        CRANIAL NERVES     CN III, IV, VI   Extraocular motions are normal.        Significant Labs:   CBC:   Recent Labs   Lab 07/27/19  1156   WBC 8.10   HGB 12.7*   HCT 37.8*        CMP:   Recent Labs   Lab 07/27/19  1156      K 3.8      CO2 26   *   BUN 14   CREATININE 1.1   CALCIUM 9.3   PROT 6.2   ALBUMIN 3.7   BILITOT 0.9   ALKPHOS 47*   AST 25   ALT 20   ANIONGAP 9   EGFRNONAA >60       Significant Imaging:   Imaging Results          X-Ray Chest AP Portable (Final result)  Result time 07/27/19 13:54:04    Final result by James Lubin MD (07/27/19 13:54:04)                 Impression:      1.  Minimal scarring or atelectasis in the left mid lung laterally.  Lungs are otherwise clear.    2.   Incidental findings as noted above.      Electronically signed by: James Lubin MD  Date:    07/27/2019  Time:    13:54             Narrative:    EXAMINATION:  XR CHEST AP PORTABLE    CLINICAL HISTORY:  Encounter for other preprocedural examination    COMPARISON:  No comparison studies are available.    FINDINGS:  The study is lordotic in position and slightly rotated to the left.  Minimal scarring or atelectasis in the lateral left mid lung.  The lungs are otherwise clear.  The cardiac silhouette size is normal. The trachea is midline and the mediastinal width is normal. Negative for focal infiltrate, effusion or pneumothorax. Pulmonary vasculature is normal. Negative for osseous abnormalities. Dual lead left subclavian pacemaker.  Median sternotomy wires and CABG changes.  Convex right curvature of the thoracic spine with marginal spondylosis.  Tortuous aorta with calcifications of the aortic knob.                               CT Head Without Contrast (Final result)  Result time 07/27/19 13:33:01    Final result by Cheikh Walsh MD (07/27/19 13:33:01)                 Impression:      Atrophy.  Intracranial atherosclerotic calcifications.  Small vessel disease.  No definite acute intracranial abnormality.  Artifacts are present from the patient's arm position..    All CT scans at this facility use dose modulation, iterative reconstruction and/or weight based dosing when appropriate to reduce radiation dose to as low as reasonably achievable.      Electronically signed by: Cheikh Walsh MD  Date:    07/27/2019  Time:    13:33             Narrative:    EXAMINATION:  CT HEAD WITHOUT CONTRAST    CLINICAL HISTORY:  Unspecified fall, initial encounterHead trauma, minor, GCS>=13, NOC/NEXUS/CCR positive, first study;    TECHNIQUE:  Axial CT images of the head were obtained without  contrast.    FINDINGS:  Mild diffuse cerebral and cerebellar atrophy.  Intracranial atherosclerotic calcifications.  Retention cyst or polyp  in the sphenoid sinus.  No acute intracranial abnormality identified...  No intra or extraaxial masses, hemorrhages, abnormal fluid collections or abnormal calcifications..                               X-Ray Elbow 2 Views Left (Final result)  Result time 07/27/19 13:11:52   Procedure changed from X-Ray Elbow Complete Left     Final result by Cheikh Walsh MD (07/27/19 13:11:52)                 Impression:      Fracture dislocation of the elbow.  No change in position or alignment      Electronically signed by: Cheikh Walsh MD  Date:    07/27/2019  Time:    13:11             Narrative:    EXAMINATION:  XR ELBOW 2 VIEWS LEFT    CLINICAL HISTORY:  fall; Pain in unspecified elbow    TECHNIQUE:  AP, lateral, and oblique views of the left elbow were performed.    COMPARISON:  None    FINDINGS:  Fracture dislocation of the elbow.                               X-Ray Humerus 2 View Left (Final result)  Result time 07/27/19 13:11:24    Final result by Cheikh Walsh MD (07/27/19 13:11:24)                 Impression:      Fracture dislocation of the elbow joint.  No change in position or alignment when compared to the prior film      Electronically signed by: Cheikh Walsh MD  Date:    07/27/2019  Time:    13:11             Narrative:    EXAMINATION:  XR HUMERUS 2 VIEW LEFT    COMPARISON:  None    FINDINGS:  Fracture dislocation of the elbow.

## 2019-07-27 NOTE — ASSESSMENT & PLAN NOTE
-S/p I&D, ORIF elbow, and ulnar nerve decompression performed by Dr. Clark  -continue IV abx x 24 hours  --PT/OT in AM

## 2019-07-27 NOTE — ASSESSMENT & PLAN NOTE
I have recommended the patient receive IV antibiotics.  The patient is also recommended to undergo incision and drainage of the left elbow with open reduction.  Patient will then be admitted in observation status for 24 hr of antibiotic treatment IV at which point he will be converted to 7 days of oral antibiotics and may be discharged.  Patient should remain NPO at this time in anticipation of operative intervention.

## 2019-07-27 NOTE — PLAN OF CARE
Problem: Adult Inpatient Plan of Care  Goal: Plan of Care Review  Outcome: Ongoing (interventions implemented as appropriate)  POC reviewed with patient. Pt verbalized understanding  Pt remains free of injuries and falls; fall precaution in place.   LUE elevated. NWB LUE. Sling in place at all times  Regular diet tolerated.  Continuous heart monitor initiated. NS, paced at times  Bed low, side rails x2, call light in reach, personal belongings at bedside.  Reminded to call for assistance.  Chart check complete. Will continue to monitor.

## 2019-07-27 NOTE — ANESTHESIA POSTPROCEDURE EVALUATION
Anesthesia Post Evaluation    Patient: Memo Lew    Procedure(s) Performed: Procedure(s) (LRB):  INCISION AND DRAINAGE (Left)  ORIF, ELBOW (Left)  DECOMPRESSION, NERVE, ULNAR (Left)    Final Anesthesia Type: general  Patient location during evaluation: PACU  Patient participation: Yes- Able to Participate  Level of consciousness: awake and alert  Post-procedure vital signs: reviewed and stable  Pain management: adequate  Airway patency: patent  PONV status at discharge: No PONV  Anesthetic complications: no      Cardiovascular status: hemodynamically stable  Respiratory status: spontaneous ventilation  Hydration status: euvolemic  Follow-up not needed.          Vitals Value Taken Time   /80 7/27/2019  4:09 PM   Temp 36.5 °C (97.7 °F) 7/27/2019 11:08 AM   Pulse 63 7/27/2019  4:11 PM   Resp 41 7/27/2019  4:11 PM   SpO2 93 % 7/27/2019  4:11 PM   Vitals shown include unvalidated device data.      No case tracking events are documented in the log.      Pain/Amarilys Score: Pain Rating Prior to Med Admin: 10 (7/27/2019  1:47 PM)

## 2019-07-27 NOTE — CONSULTS
[facility]  Orthopedics  Consult Note    Patient Name: Memo Lew  MRN: [id number]  Admission Date: 7/27/2019  Hospital Length of Stay: 0 days  Attending Provider: Mecca Cohen DO  Primary Care Provider: SAJAN Santana    Patient information was obtained from patient, spouse/SO and ER records.     Consults  Subjective:     Principal Problem:<principal problem not specified>    Chief Complaint:   Chief Complaint   Patient presents with    Fall     fell from standing level, L open elbow fx, wrist rotated, squirting blood when sitting up, pt on Plavix        HPI: This is a 68-year-old male who sustained a fall today injuring his left upper extremity.  He was evaluated in the emergency room for extreme left elbow pain.  It is unremitting.  There is an open wound with active bleeding.  The patient has been stabilized in the emergency room and preliminary x-rays demonstrate a dislocation of his left elbow.  Due to the open wound and exposed bone this is an open dislocation possibly an open fracture dislocation.  The patient has been evaluated in the emergency room by the emergency room physician and myself and the urgent nature of the injury has been explained.  Patient is recommended operative intervention.    Past Medical History:   Diagnosis Date    Crohn's disease     Hypertension     Sarcoidosis        Past Surgical History:   Procedure Laterality Date    CARDIAC SURGERY         Review of patient's allergies indicates:   Allergen Reactions    Infliximab Anaphylaxis    Iodine and iodide containing products Rash     Patient states severe rash similar to a sunburn experienced after CT contrast. Patient with prior history of pre-meds before any contrast studies.      Mercaptopurine analogues (thiopurines)        No current facility-administered medications for this encounter.      Current Outpatient Medications   Medication Sig    amiodarone (PACERONE) 200 MG Tab Take by mouth once  daily.    apixaban (ELIQUIS) 5 mg Tab Take 5 mg by mouth 2 (two) times daily.    aspirin (ECOTRIN) 81 MG EC tablet Take 81 mg by mouth once daily.    carvedilol (COREG) 25 MG tablet Take 25 mg by mouth 2 (two) times daily with meals.    folic acid (FOLVITE) 1 MG tablet Take 1 mg by mouth once daily.    furosemide (LASIX) 40 MG tablet Take 40 mg by mouth 2 (two) times daily.    gabapentin (NEURONTIN) 800 MG tablet Take 800 mg by mouth 3 (three) times daily.    pantoprazole (PROTONIX) 40 MG tablet Take 40 mg by mouth once daily.    potassium chloride (KLOR-CON) 20 mEq Pack Take 20 mEq by mouth once.    pravastatin (PRAVACHOL) 80 MG tablet Take 80 mg by mouth once daily.    ramipril (ALTACE) 10 MG capsule Take 10 mg by mouth once daily.    vitamin B comp and vit C no.6 (VITAMIN B COMP WITH VIT C NO.6 ORAL) Take by mouth.     Family History     None        Tobacco Use    Smoking status: Former Smoker    Smokeless tobacco: Never Used   Substance and Sexual Activity    Alcohol use: Not Currently    Drug use: Never    Sexual activity: Not on file     Review of Systems   Constitution: Negative.   HENT: Negative.    Eyes: Negative.    Cardiovascular: Positive for irregular heartbeat.        Patient has a history of arrhythmia and a pacemaker.   Respiratory: Negative.    Endocrine: Negative.    Hematologic/Lymphatic: Negative.         Patient is on anticoagulation.   Skin: Negative.    Musculoskeletal:        Negative prior to today's injury.   Gastrointestinal: Negative.    Neurological: Negative.    Psychiatric/Behavioral: Negative.    Allergic/Immunologic: Negative.      Objective:     Vital Signs (Most Recent):  Temp: 97.7 °F (36.5 °C) (07/27/19 1108)  Pulse: 67 (07/27/19 1246)  Resp: 16 (07/27/19 1150)  BP: 137/77 (07/27/19 1246)  SpO2: 99 % (07/27/19 1150) Vital Signs (24h Range):  Temp:  [97.7 °F (36.5 °C)] 97.7 °F (36.5 °C)  Pulse:  [61-67] 67  Resp:  [15-16] 16  SpO2:  [99 %] 99 %  BP:  (118-137)/(69-90) 137/77     Weight: 95.3 kg (210 lb)  Height: 6' (182.9 cm)  Body mass index is 28.48 kg/m².    No intake or output data in the 24 hours ending 07/27/19 1314    General    Nursing note and vitals reviewed.  Constitutional: He is oriented to person, place, and time. He appears well-developed and well-nourished. No distress.   HENT:   Head: Normocephalic and atraumatic.   Eyes: EOM are normal. Pupils are equal, round, and reactive to light.   Neck: Normal range of motion. No tracheal deviation present.   Cardiovascular: Normal rate and intact distal pulses.    Pulmonary/Chest: Effort normal. No respiratory distress. He exhibits no tenderness.   Abdominal: Soft. He exhibits no distension. There is no tenderness. There is no rebound.   Neurological: He is alert and oriented to person, place, and time.   Psychiatric: He has a normal mood and affect. His behavior is normal. Judgment and thought content normal.         Left Hand/Wrist Exam     Other     Sensory Exam  Median Distribution: normal  Ulnar Distribution: normal  Radial Distribution: normal      Right Elbow Exam   Right elbow exam is normal.      Left Elbow Exam     Inspection   Scars: absent  Bruising: present  Deformity: present  Atrophy: absent    Pain   The patient exhibits pain of the anterior joint line, lateral epicondyle, medial epicondyle and olecranon    Tenderness   The patient is tender to palpation of the lateral epicondyle, medial epicondyle, olecranon fossa, radial head and radial capitellar joint.     Range of Motion   Extension: abnormal   Flexion: abnormal   Pronation: abnormal   Supination: abnormal     Other   Sensation: normal    Comments:  Gross deformity with a medial laceration of the left elbow is noted at this time. Exposed osseous structures are also noted.  The neurologic exam of the left upper extremity demonstrates that the median, radial and ulnar nerve remained intact.  The posterior and anterior interosseous  nerves also appear intact. Pulses are brisk and capillary refill is normal.        Muscle Strength   Left Upper Extremity  Wrist extension: 5/5/5   Wrist flexion: 5/5/5   :  4/5/5   Pinch Mechanism: 5/5  Intrinsics: 5/5  EPL (Extensor Pollicis Longus): 5/5    Vascular Exam       Left Pulses      Radial:                    2+      Capillary Refill  Left Hand: normal capillary refill    Edema  Left Forearm: absent      Significant Labs:   CBC:   Recent Labs   Lab 07/27/19  1156   WBC 8.10   HGB 12.7*   HCT 37.8*        All pertinent labs within the past 24 hours have been reviewed.    Significant Imaging: X-Ray: I have reviewed all pertinent results/findings and my personal findings are:  Dislocation of the left elbow with possible radial ulnar injury as well.    Assessment/Plan:     Open dislocation of left elbow  I have recommended the patient receive IV antibiotics.  The patient is also recommended to undergo incision and drainage of the left elbow with open reduction.  Patient will then be admitted in observation status for 24 hr of antibiotic treatment IV at which point he will be converted to 7 days of oral antibiotics and may be discharged.  Patient should remain NPO at this time in anticipation of operative intervention.        Thank you for your consult.     Jules Clark,   Orthopedics  [facility]

## 2019-07-27 NOTE — ED PROVIDER NOTES
SCRIBE #1 NOTE: I, Rolly Tabor, am scribing for, and in the presence of, Mecca Cohen DO. I have scribed the entire note.       History     Chief Complaint   Patient presents with    Fall     fell from standing level, L open elbow fx, wrist rotated, squirting blood when sitting up, pt on Plavix     Review of patient's allergies indicates:   Allergen Reactions    Infliximab Anaphylaxis    Iodine and iodide containing products Rash     Patient states severe rash similar to a sunburn experienced after CT contrast. Patient with prior history of pre-meds before any contrast studies.      Mercaptopurine analogues (thiopurines)          History of Present Illness     HPI    7/27/2019, 11:17 AM  History obtained from the patient      History of Present Illness: Memo Lew is a 68 y.o. male patient with a PMHx of HTN who presents to the Emergency Department for evaluation of a fall which onset this morning. Pt states he was walking outside when he tripped and fell to the ground. Symptoms are constant and moderate in severity. No mitigating or exacerbating factors reported. Associated sxs include wound to left arm. Patient denies any LOC, neck pain, back pain, CP, SOB, and all other sxs at this time. Prior Tx reported. No further complaints or concerns at this time.         Arrival mode: Personal vehicle    PCP: SAJAN Santana        Past Medical History:  Past Medical History:   Diagnosis Date    Crohn's disease     Hypertension     Sarcoidosis        Past Surgical History:  Past Surgical History:   Procedure Laterality Date    CARDIAC PACEMAKER PLACEMENT      CARDIAC SURGERY           Family History:  History reviewed. No pertinent family history.    Social History:  Social History     Tobacco Use    Smoking status: Former Smoker    Smokeless tobacco: Never Used   Substance and Sexual Activity    Alcohol use: Not Currently    Drug use: Never    Sexual activity: Unknown        Review of Systems      Review of Systems   Constitutional: Negative for fever.        (+) fall   HENT: Negative for sore throat.    Respiratory: Negative for shortness of breath.    Cardiovascular: Negative for chest pain.   Gastrointestinal: Negative for nausea.   Genitourinary: Negative for dysuria.   Musculoskeletal: Negative for back pain.   Skin: Positive for wound (left elbow). Negative for rash.   Neurological: Negative for syncope and weakness.   Hematological: Does not bruise/bleed easily.   All other systems reviewed and are negative.       Physical Exam     Initial Vitals [07/27/19 1108]   BP Pulse Resp Temp SpO2   (!) 118/90 61 15 97.7 °F (36.5 °C) 99 %      MAP       --          Physical Exam  Nursing Notes and Vital Signs Reviewed.  Constitutional: Patient is in mild distress. Well-developed and well-nourished.  Head: Atraumatic. Normocephalic.  Eyes: PERRL. EOM intact. Conjunctivae are not pale. No scleral icterus.  ENT: Mucous membranes are moist. Oropharynx is clear and symmetric.    Neck: Supple. Full ROM. No lymphadenopathy.  No midline neck tenderness.  Cardiovascular: Regular rate. Regular rhythm. No murmurs, rubs, or gallops. Distal pulses are 2+ and symmetric.  Pulmonary/Chest: No respiratory distress. Clear to auscultation bilaterally. No wheezing or rales.  Abdominal: Soft and non-distended.  There is no tenderness.  No rebound, guarding, or rigidity. Good bowel sounds.  Genitourinary: No CVA tenderness  Musculoskeletal: Moves all extremities. No obvious deformities. No edema. No calf tenderness.  LUE: has deformity to left elbow. Positive for swelling. Positive for tenderness. ROM is normal. Cap refill distally is <2 seconds. Radial pulses are equal and 2+ bilaterally. No motor deficit. No distal sensory deficit. NVI distally.   Skin: Warm and dry.  Neurological:  Alert, awake, and appropriate.  Normal speech.  No acute focal neurological deficits are appreciated. Cranial nerves 2-12 intact.  GCS  15.  Psychiatric: Normal affect. Good eye contact. Appropriate in content.          Left elbow:  Radial head showing.               ED Course   Procedures  ED Vital Signs:  Vitals:    07/27/19 1108 07/27/19 1150 07/27/19 1156 07/27/19 1246   BP: (!) 118/90 131/69  137/77   Pulse: 61 61  67   Resp: 15 16     Temp: 97.7 °F (36.5 °C)      TempSrc: Oral      SpO2: 99% 99%     Weight:   95.3 kg (210 lb)    Height: 6' (1.829 m)          Abnormal Lab Results:  Labs Reviewed   CBC W/ AUTO DIFFERENTIAL - Abnormal; Notable for the following components:       Result Value    RBC 4.08 (*)     Hemoglobin 12.7 (*)     Hematocrit 37.8 (*)     Mean Corpuscular Hemoglobin 31.1 (*)     Gran% 77.7 (*)     Lymph% 14.2 (*)     All other components within normal limits   COMPREHENSIVE METABOLIC PANEL - Abnormal; Notable for the following components:    Glucose 124 (*)     Alkaline Phosphatase 47 (*)     All other components within normal limits   HEPATITIS C ANTIBODY   PROTIME-INR   APTT   TYPE & SCREEN        All Lab Results:  Results for orders placed or performed during the hospital encounter of 07/27/19   Hepatitis C antibody   Result Value Ref Range    Hepatitis C Ab Negative    CBC auto differential   Result Value Ref Range    WBC 8.10 3.90 - 12.70 K/uL    RBC 4.08 (L) 4.60 - 6.20 M/uL    Hemoglobin 12.7 (L) 14.0 - 18.0 g/dL    Hematocrit 37.8 (L) 40.0 - 54.0 %    Mean Corpuscular Volume 93 82 - 98 fL    Mean Corpuscular Hemoglobin 31.1 (H) 27.0 - 31.0 pg    Mean Corpuscular Hemoglobin Conc 33.6 32.0 - 36.0 g/dL    RDW 13.7 11.5 - 14.5 %    Platelets 179 150 - 350 K/uL    MPV 10.9 9.2 - 12.9 fL    Gran # (ANC) 6.3 1.8 - 7.7 K/uL    Lymph # 1.2 1.0 - 4.8 K/uL    Mono # 0.5 0.3 - 1.0 K/uL    Eos # 0.1 0.0 - 0.5 K/uL    Baso # 0.03 0.00 - 0.20 K/uL    Gran% 77.7 (H) 38.0 - 73.0 %    Lymph% 14.2 (L) 18.0 - 48.0 %    Mono% 6.4 4.0 - 15.0 %    Eosinophil% 1.5 0.0 - 8.0 %    Basophil% 0.4 0.0 - 1.9 %    Differential Method Automated     Comprehensive metabolic panel   Result Value Ref Range    Sodium 141 136 - 145 mmol/L    Potassium 3.8 3.5 - 5.1 mmol/L    Chloride 106 95 - 110 mmol/L    CO2 26 23 - 29 mmol/L    Glucose 124 (H) 70 - 110 mg/dL    BUN, Bld 14 8 - 23 mg/dL    Creatinine 1.1 0.5 - 1.4 mg/dL    Calcium 9.3 8.7 - 10.5 mg/dL    Total Protein 6.2 6.0 - 8.4 g/dL    Albumin 3.7 3.5 - 5.2 g/dL    Total Bilirubin 0.9 0.1 - 1.0 mg/dL    Alkaline Phosphatase 47 (L) 55 - 135 U/L    AST 25 10 - 40 U/L    ALT 20 10 - 44 U/L    Anion Gap 9 8 - 16 mmol/L    eGFR if African American >60 >60 mL/min/1.73 m^2    eGFR if non African American >60 >60 mL/min/1.73 m^2   Protime-INR   Result Value Ref Range    Prothrombin Time 10.7 9.0 - 12.5 sec    INR 1.0 0.8 - 1.2   APTT   Result Value Ref Range    aPTT 27.2 21.0 - 32.0 sec   Type & Screen   Result Value Ref Range    Group & Rh B NEG     Indirect [place] NEG        The EKG was ordered, reviewed, and independently interpreted by the ED provider.  Interpretation time: 1400  Rate: 61 BPM  Rhythm: Artial-paced rhythm with prolonged AV conduction.  Interpretation: Left axis deviation. Nonspecific intraventricular conduction delayed.. No STEMI.        Imaging Results:  Imaging Results          X-Ray Chest AP Portable (Final result)  Result time 07/27/19 13:54:04    Final result by James Lubin MD (07/27/19 13:54:04)                 Impression:      1.  Minimal scarring or atelectasis in the left mid lung laterally.  Lungs are otherwise clear.    2.  Incidental findings as noted above.      Electronically signed by: James Lubin MD  Date:    07/27/2019  Time:    13:54             Narrative:    EXAMINATION:  XR CHEST AP PORTABLE    CLINICAL HISTORY:  Encounter for other preprocedural examination    COMPARISON:  No comparison studies are available.    FINDINGS:  The study is lordotic in position and slightly rotated to the left.  Minimal scarring or atelectasis in the lateral left mid lung.  The lungs are  otherwise clear.  The cardiac silhouette size is normal. The trachea is midline and the mediastinal width is normal. Negative for focal infiltrate, effusion or pneumothorax. Pulmonary vasculature is normal. Negative for osseous abnormalities. Dual lead left subclavian pacemaker.  Median sternotomy wires and CABG changes.  Convex right curvature of the thoracic spine with marginal spondylosis.  Tortuous aorta with calcifications of the aortic knob.                               CT Head Without Contrast (Final result)  Result time 07/27/19 13:33:01    Final result by Cheikh Walsh MD (07/27/19 13:33:01)                 Impression:      Atrophy.  Intracranial atherosclerotic calcifications.  Small vessel disease.  No definite acute intracranial abnormality.  Artifacts are present from the patient's arm position..    All CT scans at this facility use dose modulation, iterative reconstruction and/or weight based dosing when appropriate to reduce radiation dose to as low as reasonably achievable.      Electronically signed by: Cheikh Walsh MD  Date:    07/27/2019  Time:    13:33             Narrative:    EXAMINATION:  CT HEAD WITHOUT CONTRAST    CLINICAL HISTORY:  Unspecified fall, initial encounterHead trauma, minor, GCS>=13, NOC/NEXUS/CCR positive, first study;    TECHNIQUE:  Axial CT images of the head were obtained without  contrast.    FINDINGS:  Mild diffuse cerebral and cerebellar atrophy.  Intracranial atherosclerotic calcifications.  Retention cyst or polyp in the sphenoid sinus.  No acute intracranial abnormality identified...  No intra or extraaxial masses, hemorrhages, abnormal fluid collections or abnormal calcifications..                               X-Ray Elbow 2 Views Left (Final result)  Result time 07/27/19 13:11:52   Procedure changed from X-Ray Elbow Complete Left     Final result by Cheikh Walsh MD (07/27/19 13:11:52)                 Impression:      Fracture dislocation of the elbow.  No change in  position or alignment      Electronically signed by: Cheikh Walsh MD  Date:    07/27/2019  Time:    13:11             Narrative:    EXAMINATION:  XR ELBOW 2 VIEWS LEFT    CLINICAL HISTORY:  fall; Pain in unspecified elbow    TECHNIQUE:  AP, lateral, and oblique views of the left elbow were performed.    COMPARISON:  None    FINDINGS:  Fracture dislocation of the elbow.                               X-Ray Humerus 2 View Left (Final result)  Result time 07/27/19 13:11:24    Final result by Cheikh Walsh MD (07/27/19 13:11:24)                 Impression:      Fracture dislocation of the elbow joint.  No change in position or alignment when compared to the prior film      Electronically signed by: Cheikh Walsh MD  Date:    07/27/2019  Time:    13:11             Narrative:    EXAMINATION:  XR HUMERUS 2 VIEW LEFT    COMPARISON:  None    FINDINGS:  Fracture dislocation of the elbow.                                      The Emergency Provider reviewed the vital signs and test results, which are outlined above.     ED Discussion     11:55 PM: Discussed pt's case with Dr. Clark (Orthopedic Surgery) who states he will come evaluate the pt in the ED.    12:09 PM: Re-evaluated pt. Pt is awake, alert, and oriented.  D/w pt all pertinent results. D/w pt any concerns expressed at this time. Answered all questions. Pt expresses understanding at this time.    2:07 PM: Discussed case with Eugenia Barr NP (Hospital Medicine). Dr. Jonas agrees with current care and management of pt and accepts admission.   Admitting Service: Hospital Medicine  Admitting Physician: Dr. Jonas  Admit to: Med tele    2:08 PM: Re-evaluated pt. I have discussed test results, shared treatment plan, and the need for admission with patient and family at bedside. Pt and family express understanding at this time and agree with all information. All questions answered. Pt and family have no further questions or concerns at this time. Pt is ready for  admit.          ED Medication(s):  Medications   chlorhexidine 0.12 % solution 10 mL (has no administration in time range)   nozaseptin (NOZIN) nasal  (has no administration in time range)   sodium chloride 0.9% flush 10 mL (has no administration in time range)   oxyCODONE-acetaminophen 5-325 mg per tablet 1 tablet (has no administration in time range)   fentaNYL injection 25 mcg (has no administration in time range)   hydromorphone (PF) injection 0.2 mg (has no administration in time range)   meperidine injection 12.5 mg (has no administration in time range)   diphenhydrAMINE injection 25 mg (has no administration in time range)   promethazine (PHENERGAN) 6.25 mg in dextrose 5 % 50 mL IVPB (has no administration in time range)   fentaNYL injection 50 mcg (50 mcg Intravenous Given 7/27/19 1148)   ondansetron injection 4 mg (4 mg Intravenous Given 7/27/19 1148)   ceFAZolin injection 1 g (1 g Intramuscular Given 7/27/19 1239)   fentaNYL injection 75 mcg (75 mcg Intravenous Given by Other 7/27/19 1246)   fentaNYL injection 75 mcg (75 mcg Intravenous Given 7/27/19 1347)       Current Discharge Medication List          Follow-up Information     Jules Clark DO. Call in 1 week.    Specialty:  Orthopedic Surgery  Why:  For wound re-check  Contact information:  [address] [place] [address] [address] Ori [place]ON [address] [phone]                   MIPS Measure #415:  Emergency Department Utilization of CT for Minor Blunt Head Trauma for Patients age 18 Years  and  Older.    Measure exclusions:  · The patient has a ventricular shunt?   No  · The patient has a brain tumor? No  · The patient has multi-system trauma?  No  · The patient is pregnant? N/A  · The patient is taking anti platelet medication excluding aspirin?  Yes  · Age 65 years and older?  Yes  Patient is 68 y.o.    A head CT was ordered? Yes:   The CT was ordered by the emergency provider?  Yes                Medical Decision Making:   Clinical Tests:   Lab  Tests: Ordered and Reviewed  Radiological Study: Ordered and Reviewed  Medical Tests: Ordered and Reviewed             Scribe Attestation:   Scribe #1: I performed the above scribed service and the documentation accurately describes the services I performed. I attest to the accuracy of the note.     Attending:   Physician Attestation Statement for Scribe #1: I, Mecca Cohen DO, personally performed the services described in this documentation, as scribed by Rolly Tabor, in my presence, and it is both accurate and complete.           Clinical Impression       ICD-10-CM ICD-9-CM   1. Open dislocation of left elbow, initial encounter S53.105A 832.10    S51.002A    2. Fall W19.XXXA E888.9   3. Elbow pain M25.529 719.42   4. Preoperative clearance Z01.818 V72.84   5. Tachycardia R00.0 785.0   6. Pacemaker Z95.0 V45.01       Disposition:   Disposition: Admitted  Condition: Fair         Mecca Cohen DO  07/27/19 1601

## 2019-07-27 NOTE — ANESTHESIA PREPROCEDURE EVALUATION
07/27/2019  Memo Lew is a 68 y.o., male.    Pre-op Assessment    I have reviewed the Patient Summary Reports.     I have reviewed the Nursing Notes.   I have reviewed the Medications.     Review of Systems  Anesthesia Hx:  No problems with previous Anesthesia Denies Hx of Anesthetic complications  History of prior surgery of interest to airway management or planning: heart surgery. Previous anesthesia: General Denies Family Hx of Anesthesia complications.   Denies Personal Hx of Anesthesia complications.   Social:  Non-Smoker, No Alcohol Use    Hematology/Oncology:  Hematology Normal   Oncology Normal     EENT/Dental:EENT/Dental Normal   Cardiovascular:  Cardiovascular Normal Exercise tolerance: good  NYHA Classification I ECG has been reviewed.    Pulmonary:  Pulmonary Normal    Renal/:  Renal/ Normal     Hepatic/GI:  Hepatic/GI Normal    Musculoskeletal:  Musculoskeletal Normal    Neurological:  Neurology Normal    Endocrine:  Endocrine Normal    Dermatological:  Skin Normal    Psych:  Psychiatric Normal           Physical Exam  General:  Well nourished    Airway/Jaw/Neck:  Airway Findings: Mouth Opening: Normal Tongue: Normal  General Airway Assessment: Adult  Mallampati: II  TM Distance: 4 - 6 cm  Jaw/Neck Findings:  Neck ROM: Normal ROM      Dental:  Dental Findings: In tact   Chest/Lungs:  Chest/Lungs Findings: Clear to auscultation, Normal Respiratory Rate     Heart/Vascular:  Heart Findings: Rate: Normal  Rhythm: Regular Rhythm  Sounds: Normal        Mental Status:  Mental Status Findings:  Cooperative, Alert and Oriented         Anesthesia Plan  Type of Anesthesia, risks & benefits discussed:  Anesthesia Type:  general  Patient's Preference:   Intra-op Monitoring Plan: standard ASA monitors  Intra-op Monitoring Plan Comments:   Post Op Pain Control Plan: multimodal analgesia  Post Op Pain  Control Plan Comments:   Induction:   IV  Beta Blocker:         Informed Consent: Patient understands risks and agrees with Anesthesia plan.  Questions answered. Anesthesia consent signed with patient.  ASA Score: 3  emergent   Day of Surgery Review of History & Physical: I have interviewed and examined the patient. I have reviewed the patient's H&P dated:

## 2019-07-28 LAB
ANION GAP SERPL CALC-SCNC: 11 MMOL/L (ref 8–16)
BASOPHILS # BLD AUTO: 0.02 K/UL (ref 0–0.2)
BASOPHILS NFR BLD: 0.2 % (ref 0–1.9)
BUN SERPL-MCNC: 11 MG/DL (ref 8–23)
CALCIUM SERPL-MCNC: 8.7 MG/DL (ref 8.7–10.5)
CHLORIDE SERPL-SCNC: 105 MMOL/L (ref 95–110)
CO2 SERPL-SCNC: 23 MMOL/L (ref 23–29)
CREAT SERPL-MCNC: 1.1 MG/DL (ref 0.5–1.4)
DIFFERENTIAL METHOD: ABNORMAL
EOSINOPHIL # BLD AUTO: 0.1 K/UL (ref 0–0.5)
EOSINOPHIL NFR BLD: 1.6 % (ref 0–8)
ERYTHROCYTE [DISTWIDTH] IN BLOOD BY AUTOMATED COUNT: 14 % (ref 11.5–14.5)
EST. GFR  (AFRICAN AMERICAN): >60 ML/MIN/1.73 M^2
EST. GFR  (NON AFRICAN AMERICAN): >60 ML/MIN/1.73 M^2
GLUCOSE SERPL-MCNC: 128 MG/DL (ref 70–110)
HCT VFR BLD AUTO: 34.3 % (ref 40–54)
HGB BLD-MCNC: 11.3 G/DL (ref 14–18)
LYMPHOCYTES # BLD AUTO: 1.5 K/UL (ref 1–4.8)
LYMPHOCYTES NFR BLD: 17.1 % (ref 18–48)
MCH RBC QN AUTO: 31.4 PG (ref 27–31)
MCHC RBC AUTO-ENTMCNC: 32.9 G/DL (ref 32–36)
MCV RBC AUTO: 95 FL (ref 82–98)
MONOCYTES # BLD AUTO: 1.1 K/UL (ref 0.3–1)
MONOCYTES NFR BLD: 11.7 % (ref 4–15)
NEUTROPHILS # BLD AUTO: 6.3 K/UL (ref 1.8–7.7)
NEUTROPHILS NFR BLD: 69.5 % (ref 38–73)
PLATELET # BLD AUTO: 171 K/UL (ref 150–350)
PMV BLD AUTO: 11.6 FL (ref 9.2–12.9)
POTASSIUM SERPL-SCNC: 4 MMOL/L (ref 3.5–5.1)
RBC # BLD AUTO: 3.6 M/UL (ref 4.6–6.2)
SODIUM SERPL-SCNC: 139 MMOL/L (ref 136–145)
WBC # BLD AUTO: 9.01 K/UL (ref 3.9–12.7)

## 2019-07-28 PROCEDURE — G8978 MOBILITY CURRENT STATUS: HCPCS | Mod: CJ

## 2019-07-28 PROCEDURE — 97165 OT EVAL LOW COMPLEX 30 MIN: CPT

## 2019-07-28 PROCEDURE — G8979 MOBILITY GOAL STATUS: HCPCS | Mod: CI

## 2019-07-28 PROCEDURE — 25000003 PHARM REV CODE 250: Performed by: ORTHOPAEDIC SURGERY

## 2019-07-28 PROCEDURE — 80048 BASIC METABOLIC PNL TOTAL CA: CPT

## 2019-07-28 PROCEDURE — 25000003 PHARM REV CODE 250: Performed by: NURSE PRACTITIONER

## 2019-07-28 PROCEDURE — 97530 THERAPEUTIC ACTIVITIES: CPT

## 2019-07-28 PROCEDURE — 25000003 PHARM REV CODE 250: Performed by: INTERNAL MEDICINE

## 2019-07-28 PROCEDURE — 97161 PT EVAL LOW COMPLEX 20 MIN: CPT

## 2019-07-28 PROCEDURE — 97116 GAIT TRAINING THERAPY: CPT

## 2019-07-28 PROCEDURE — 36415 COLL VENOUS BLD VENIPUNCTURE: CPT

## 2019-07-28 PROCEDURE — 63600175 PHARM REV CODE 636 W HCPCS: Performed by: NURSE PRACTITIONER

## 2019-07-28 PROCEDURE — 85025 COMPLETE CBC W/AUTO DIFF WBC: CPT

## 2019-07-28 RX ORDER — CEFAZOLIN SODIUM 1 G/50ML
1 SOLUTION INTRAVENOUS
Status: DISCONTINUED | OUTPATIENT
Start: 2019-07-28 | End: 2019-07-28 | Stop reason: HOSPADM

## 2019-07-28 RX ORDER — DIPHENHYDRAMINE HCL 25 MG
25 CAPSULE ORAL EVERY 6 HOURS PRN
Status: DISCONTINUED | OUTPATIENT
Start: 2019-07-28 | End: 2019-07-28 | Stop reason: HOSPADM

## 2019-07-28 RX ORDER — HYDROCODONE BITARTRATE AND ACETAMINOPHEN 5; 325 MG/1; MG/1
1 TABLET ORAL EVERY 4 HOURS PRN
Qty: 15 TABLET | Refills: 0 | Status: SHIPPED | OUTPATIENT
Start: 2019-07-28 | End: 2019-08-02

## 2019-07-28 RX ORDER — ACETAMINOPHEN 500 MG
500 TABLET ORAL EVERY 6 HOURS
Status: DISCONTINUED | OUTPATIENT
Start: 2019-07-28 | End: 2019-07-28 | Stop reason: HOSPADM

## 2019-07-28 RX ORDER — SULFAMETHOXAZOLE AND TRIMETHOPRIM 800; 160 MG/1; MG/1
1 TABLET ORAL 2 TIMES DAILY
Qty: 14 TABLET | Refills: 0 | Status: SHIPPED | OUTPATIENT
Start: 2019-07-28 | End: 2019-08-04

## 2019-07-28 RX ADMIN — OXYCODONE HYDROCHLORIDE 10 MG: 5 TABLET ORAL at 01:07

## 2019-07-28 RX ADMIN — PANTOPRAZOLE SODIUM 40 MG: 40 TABLET, DELAYED RELEASE ORAL at 09:07

## 2019-07-28 RX ADMIN — CHLORHEXIDINE GLUCONATE 0.12% ORAL RINSE 10 ML: 1.2 LIQUID ORAL at 09:07

## 2019-07-28 RX ADMIN — GABAPENTIN 800 MG: 400 CAPSULE ORAL at 09:07

## 2019-07-28 RX ADMIN — DIPHENHYDRAMINE HYDROCHLORIDE 25 MG: 25 CAPSULE ORAL at 09:07

## 2019-07-28 RX ADMIN — PRAVASTATIN SODIUM 80 MG: 20 TABLET ORAL at 09:07

## 2019-07-28 RX ADMIN — CARVEDILOL 25 MG: 12.5 TABLET, FILM COATED ORAL at 09:07

## 2019-07-28 RX ADMIN — GABAPENTIN 800 MG: 400 CAPSULE ORAL at 03:07

## 2019-07-28 RX ADMIN — FUROSEMIDE 40 MG: 40 TABLET ORAL at 09:07

## 2019-07-28 RX ADMIN — CEFAZOLIN SODIUM 1 G: 1 SOLUTION INTRAVENOUS at 03:07

## 2019-07-28 RX ADMIN — CEFAZOLIN SODIUM 1 G: 1 SOLUTION INTRAVENOUS at 09:07

## 2019-07-28 RX ADMIN — FOLIC ACID 1 MG: 1 TABLET ORAL at 09:07

## 2019-07-28 RX ADMIN — ASPIRIN 81 MG: 81 TABLET, COATED ORAL at 09:07

## 2019-07-28 RX ADMIN — ACETAMINOPHEN 500 MG: 500 TABLET ORAL at 01:07

## 2019-07-28 RX ADMIN — POTASSIUM CHLORIDE 20 MEQ: 1500 TABLET, EXTENDED RELEASE ORAL at 09:07

## 2019-07-28 RX ADMIN — RAMIPRIL 10 MG: 5 CAPSULE ORAL at 09:07

## 2019-07-28 RX ADMIN — OXYCODONE HYDROCHLORIDE 10 MG: 5 TABLET ORAL at 05:07

## 2019-07-28 RX ADMIN — OXYCODONE HYDROCHLORIDE 10 MG: 5 TABLET ORAL at 09:07

## 2019-07-28 NOTE — HOSPITAL COURSE
Memo Lew is a 68 year old male who was admitted to [facility] for open dislocation of left elbow. He underwent incision and drainage and ORIF of left elbow with ulnar nerve decompression. Surgery performed by Dr. Clark without acute complication. He was continued on IV abx for total of 24 hours. He was educated on weight bearing status. He will continue Bactrim x 7 days. He was asked to follow up with Dr. Olson on Friday. Patient seen and examined on date of discharge and deemed suitable.

## 2019-07-28 NOTE — DISCHARGE INSTRUCTIONS
Reinforce the dressing as needed.  Please use Ice as necessary to the area of your elbow.  Use the sling for comfort.  Expect bruising in the arm and hand over time due to the effects of gravity.  Continue to move your fingers and make a gentle fist to both avoid stiffness and pump out the swelling.  Follow up on Friday with Dr. Olson in the clinic.  Continue taking Tylenol 500 mg every 6 hr around the clock.  Take the antibiotics as instructed until they are gone.    Resume taking your Eliquis on Tuesday.

## 2019-07-28 NOTE — NURSING
AVS reviewed with patient and wife. Follow up appointment and d/c medication discussed, D/C medication sent to verified pharmacy. Patient aware of weight bearing status to LUE. Patient verbalized understanding with teach back.Outer dressing changed by nurse per MD order. Patient tolerated well. No complaints at this time. No further needs. IV removed per order. Patient tolerated well. To be transported home per family.

## 2019-07-28 NOTE — SUBJECTIVE & OBJECTIVE
Principal Problem:Open dislocation of left elbow    Principal Orthopedic Problem:  Open dislocation of the left elbow    Interval History:  This is a 68-year-old male who presented to the emergency room yesterday with an open dislocation of his left elbow.  He underwent irrigation, debridement, open reduction of the elbow with left ulnar nerve decompression without incident yesterday.  He was held in observation status for IV antibiotic administration.  He will complete this at 2:00 p.m. this afternoon at which point he will be discharged.    Review of patient's allergies indicates:   Allergen Reactions    Infliximab Anaphylaxis    Iodine and iodide containing products Rash     Patient states severe rash similar to a sunburn experienced after CT contrast. Patient with prior history of pre-meds before any contrast studies.      Mercaptopurine analogues (thiopurines)        Current Facility-Administered Medications   Medication    0.9%  NaCl infusion    acetaminophen tablet 500 mg    amiodarone tablet 200 mg    aspirin EC tablet 81 mg    carvedilol tablet 25 mg    ceFAZolin (ANCEF) 1 gram in dextrose 5 % 50 mL IVPB (premix)    chlorhexidine 0.12 % solution 10 mL    diphenhydrAMINE capsule 25 mg    folic acid tablet 1 mg    furosemide tablet 40 mg    gabapentin capsule 800 mg    HYDROcodone-acetaminophen 5-325 mg per tablet 1 tablet    nozaseptin (NOZIN) nasal     nozaseptin (NOZIN) nasal     ondansetron injection 4 mg    oxyCODONE immediate release tablet 10 mg    pantoprazole EC tablet 40 mg    potassium chloride SA CR tablet 20 mEq    pravastatin tablet 80 mg    ramipril capsule 10 mg    sodium chloride 0.9% flush 10 mL    sodium chloride 0.9% flush 10 mL    Tdap vaccine injection 0.5 mL    traMADol tablet 50 mg     Objective:     Vital Signs (Most Recent):  Temp: 98.5 °F (36.9 °C) (07/28/19 0721)  Pulse: 61 (07/28/19 0955)  Resp: 15 (07/28/19 0721)  BP: 138/63 (07/28/19  0721)  SpO2: 96 % (07/28/19 0721) Vital Signs (24h Range):  Temp:  [97 °F (36.1 °C)-98.5 °F (36.9 °C)] 98.5 °F (36.9 °C)  Pulse:  [60-67] 61  Resp:  [15-22] 15  SpO2:  [95 %-100 %] 96 %  BP: (137-179)/(63-98) 138/63     Weight: 95.3 kg (210 lb 1.6 oz)  Height: 6' (182.9 cm)  Body mass index is 28.49 kg/m².      Intake/Output Summary (Last 24 hours) at 7/28/2019 1207  Last data filed at 7/27/2019 1700  Gross per 24 hour   Intake 1950 ml   Output 0 ml   Net 1950 ml       General    Constitutional: He is oriented to person, place, and time. He appears well-developed and well-nourished.   HENT:   Head: Atraumatic.   Neck: Normal range of motion.   Cardiovascular: Regular rhythm and intact distal pulses.    Pulmonary/Chest: Effort normal.   Abdominal: Soft.   Neurological: He is alert and oriented to person, place, and time.   Psychiatric: He has a normal mood and affect. His behavior is normal. Judgment and thought content normal.         Left Hand/Wrist Exam     Range of Motion   The patient has normal left hand/wrist ROM.    Other     Sensory Exam  Median Distribution: normal  Ulnar Distribution: normal  Radial Distribution: normal      Right Elbow Exam   Right elbow exam is normal.      Left Elbow Exam     Inspection   Deformity: absent    Pain   The patient exhibits pain of the medial epicondyle and olecranon    Tenderness   The patient is tender to palpation of the medial epicondyle.     Range of Motion   Extension: abnormal   Flexion: abnormal   Pronation: abnormal   Supination: abnormal     Comments:  Patient is postop day 1 from open reduction of open left elbow dislocation, irrigation and debridement and ulnar nerve decompression.  Full hand function is noted at this time and there is no neurologic deficit appreciated.  Pulses are intact radial and ulnar.  Swelling of the hand fingers and wrist is appreciated as expected. There is no evidence of compartment syndrome.  Patient remained stable in his  splint.        Muscle Strength   Left Upper Extremity  Wrist extension: 5/5/5   Wrist flexion: 5/5/5   :  5/5/5   Pinch Mechanism: 5/5  Intrinsics: 5/5  EPL (Extensor Pollicis Longus): 5/5    Vascular Exam       Left Pulses      Radial:                    2+      Capillary Refill  Left Hand: normal capillary refill    Edema  Left Forearm: present      Significant Labs:   BMP:   Recent Labs   Lab 07/28/19  0445   *      K 4.0      CO2 23   BUN 11   CREATININE 1.1   CALCIUM 8.7     CBC:   Recent Labs   Lab 07/27/19  1156 07/28/19  0445   WBC 8.10 9.01   HGB 12.7* 11.3*   HCT 37.8* 34.3*    171     All pertinent labs within the past 24 hours have been reviewed.    Significant Imaging: X-Ray: I have reviewed all pertinent results/findings and my personal findings are:  X-rays taken in the operating room of the left elbow show a reduced elbow in acceptable position.

## 2019-07-28 NOTE — PT/OT/SLP EVAL
Physical Therapy Evaluation    Patient Name:  Memo Lew   MRN:  [id number]    Recommendations:     Discharge Recommendations:  other (see comments)(outpatient therapy when medically appropriate, according to MD protocol)   Discharge Equipment Recommendations: none   Barriers to discharge: None    Assessment:     Memo Lew is a 68 y.o. male admitted with a medical diagnosis of Open dislocation of left elbow.  He presents with the following impairments/functional limitations:  impaired self care skills, impaired functional mobilty, gait instability, impaired balance, decreased upper extremity function, pain, orthopedic precautions.    Rehab Prognosis: Good; patient would benefit from acute skilled PT services to address these deficits and reach maximum level of function.    Recent Surgery: Procedure(s) (LRB):  INCISION AND DRAINAGE (Left)  ORIF, ELBOW (Left)  DECOMPRESSION, NERVE, ULNAR (Left) 1 Day Post-Op    Plan:     During this hospitalization, patient to be seen 5 x/week to address the identified rehab impairments via gait training, therapeutic activities, therapeutic exercises and progress toward the following goals:    · Plan of Care Expires:  08/04/19    Subjective     Chief Complaint: Left elbow pain and drainage from surgery site  Patient/Family Comments/goals: To go home  Pain/Comfort:  · Pain Rating 1: 4/10  · Location - Side 1: Left  · Location 1: elbow  · Pain Addressed 1: Pre-medicate for activity, Reposition, Distraction, Cessation of Activity    Patients cultural, spiritual, Episcopal conflicts given the current situation: no    Living Environment:  Patient lives home with his spouse in a one-story house with one step to enter.  Prior to admission, patients level of function was indep with amb and ADLs.  Patient was very independent prior to hospital admit and was constructing a porch/deck when his injury occurred.  Equipment used at home: CPAP.  DME owned (not currently used): none.  Upon  "discharge, patient will have assistance from his family.    Objective:     Communicated with Epic and nurse Aguilar prior to session.  Patient found supine with peripheral IV, telemetry  upon PT entry to room.    General Precautions: Standard, fall   Orthopedic Precautions:LUE non weight bearing(sling at all times)   Braces: UE Sling(sling at all times)     Exams:  · Cognitive Exam:  Patient is oriented to Person, Place, Time and Situation  · Gross Motor Coordination:  WFL  · Postural Exam:  Patient presented with the following abnormalities:    · -       Rounded shoulders  · -       Forward head  · Sensation:    · -       Intact  · RLE ROM: WFL  · RLE Strength: WFL  · LLE ROM: WFL  · LLE Strength: WFL    Functional Mobility:  · Bed Mobility:     · Rolling Right: stand by assistance  · Scooting: stand by assistance  · Supine to Sit: contact guard assistance  · Transfers:     · Sit to Stand:  contact guard assistance with no AD  · Bed to Chair: contact guard assistance with  no AD  using  Stand Pivot  · Gait: Patient amb 200 feet without AD with CGA and using railing along the wall at times.  Patient stated that he had an "inner ear" issue that makes his off-balance at times.  Minimally unsteady.  · Balance: Good sitting balance; fair standing balance      Therapeutic Activities and Exercises:   Patient participated in bed mobility, transfer training, and gait training.  During gait training, patient performed static standing twice with CGA and holding onto a stable surface.      AM-PAC 6 CLICK MOBILITY  Total Score:16     Patient left up in chair with all lines intact, call button in reach, chair alarm on and family present.    GOALS:   Multidisciplinary Problems     Physical Therapy Goals        Problem: Physical Therapy Goal    Goal Priority Disciplines Outcome Goal Variances Interventions   Physical Therapy Goal     PT, PT/OT      Description:  LTGs to be met within 7 days (8/4/19):  1.  Patient will perform bed " mobility with modified independence  2.  Patient will perform functional transfers without assistive device with modified independence  3.  Patient will ambulate 400' without AD with SPV and no gross LOB  4.  Patient will demo good dynamic standing balance                    History:     Past Medical History:   Diagnosis Date    Crohn's disease     Hypertension     Sarcoidosis        Past Surgical History:   Procedure Laterality Date    CARDIAC PACEMAKER PLACEMENT      CARDIAC SURGERY         Time Tracking:     PT Received On: 07/28/19  PT Start Time: 1050     PT Stop Time: 1113  PT Total Time (min): 23 min     Billable Minutes: Evaluation 15 min and Gait Training 8 min      Rosa Hernández, PT, DPT  07/28/2019

## 2019-07-28 NOTE — PT/OT/SLP EVAL
Occupational Therapy   Evaluation and Discharge Note    Name: Memo Lew  MRN: [id number]  Admitting Diagnosis:  Open dislocation of left elbow 1 Day Post-Op    Recommendations:     Discharge Recommendations: outpatient OT(when medically appropriate; according to md protocol)  Discharge Equipment Recommendations:  none  Barriers to discharge:  None    Assessment:     Memo Lew is a 68 y.o. male with a medical diagnosis of Open dislocation of left elbow. At this time, patient is functioning at their prior level of function and does not require further acute OT services.     Plan:     During this hospitalization, patient does not require further acute OT services.  Please re-consult if situation changes.    · Plan of Care Reviewed with: patient, spouse, daughter, family    Subjective     Chief Complaint:   Patient/Family Comments/goals:     Occupational Profile:  Living Environment: lives spouse in 1 story house with 1 step to enter  Previous level of function: (I) with adl's and functional mobility  Roles and Routines: occupational therapy  Equipment Used at home:  CPAP  Assistance upon Discharge:     Pain/Comfort:  · Pain Rating 1: 4/10  · Location - Side 1: Left  · Location - Orientation 1: upper  · Location 1: elbow    Patients cultural, spiritual, Bahai conflicts given the current situation:      Objective:     Communicated with: nurse and epic chart review prior to session.  Patient found HOB elevated with telemetry upon OT entry to room.    General Precautions: Standard, fall   Orthopedic Precautions:N/A   Braces: N/A, UE brace     Occupational Performance:    Bed Mobility:    · Patient completed Rolling/Turning to Left with  stand by assistance  · Patient completed Scooting/Bridging with stand by assistance  · Patient completed Supine to Sit with stand by assistance    Functional Mobility/Transfers:  · Patient completed Sit <> Stand Transfer with stand by assistance  with  no assistive device  "  · Patient completed Bed <> Chair Transfer using Step Transfer technique with stand by assistance with no assistive device  · Functional Mobility: pt ambulated 400 feet with sba holding on side rails at times due to plof "inner ear"      Activities of Daily Living:  · Upper Body Dressing: minimum assistance due to l ue half cast. spouse will assist with ue dressing  · Lower Body Dressing: stand by assistance socks  · Toileting: supervision per pt/family report          Cognitive/Visual Perceptual:  Cognitive/Psychosocial Skills:     -       Oriented to: Person, Place, Time and Situation   -       Follows Commands/attention:Follows two-step commands  -       Communication: clear/fluent  -       Memory: No Deficits noted  -       Safety awareness/insight to disability: intact   Visual/Perceptual:      -Intact .    Physical Exam:  Upper Extremity Range of Motion:     -       Right Upper Extremity: WFL  -       Left Upper Extremity: not tested  Upper Extremity Strength:    -       Right Upper Extremity: WFL  -       Left Upper Extremity: not tested   Strength:    -       Right Upper Extremity: WFL  -       Left Upper Extremity: not tested    AMPAC 6 Click ADL:  AMPAC Total Score: 22    Treatment & Education:    Education:    Patient left up in chair with all lines intact, call button in reach, chair alarm on, nurse Lauren notified and family present    GOALS:   Multidisciplinary Problems     Occupational Therapy Goals     Not on file                History:     Past Medical History:   Diagnosis Date    Crohn's disease     Hypertension     Sarcoidosis        Past Surgical History:   Procedure Laterality Date    CARDIAC PACEMAKER PLACEMENT      CARDIAC SURGERY         Time Tracking:     OT Date of Treatment: 07/28/19  OT Start Time: 1050  OT Stop Time: 1115  OT Total Time (min): 25 min    Billable Minutes:Evaluation 10 minutes  Therapeutic Activity 15 minutes    Destiny Oliver, RITU  7/28/2019    "

## 2019-07-28 NOTE — PROGRESS NOTES
[facility]  Orthopedics  Progress Note    Patient Name: Memo Lew  MRN: [id number]  Admission Date: 7/27/2019  Hospital Length of Stay: 1 days  Attending Provider: Odalis Jonas MD  Primary Care Provider: SAJAN Santana  Follow-up For: Procedure(s) (LRB):  INCISION AND DRAINAGE (Left)  ORIF, ELBOW (Left)  DECOMPRESSION, NERVE, ULNAR (Left)    Post-Operative Day: 1 Day Post-Op  Subjective:     Principal Problem:Open dislocation of left elbow    Principal Orthopedic Problem:  Open dislocation of the left elbow    Interval History:  This is a 68-year-old male who presented to the emergency room yesterday with an open dislocation of his left elbow.  He underwent irrigation, debridement, open reduction of the elbow with left ulnar nerve decompression without incident yesterday.  He was held in observation status for IV antibiotic administration.  He will complete this at 2:00 p.m. this afternoon at which point he will be discharged.    Review of patient's allergies indicates:   Allergen Reactions    Infliximab Anaphylaxis    Iodine and iodide containing products Rash     Patient states severe rash similar to a sunburn experienced after CT contrast. Patient with prior history of pre-meds before any contrast studies.      Mercaptopurine analogues (thiopurines)        Current Facility-Administered Medications   Medication    0.9%  NaCl infusion    acetaminophen tablet 500 mg    amiodarone tablet 200 mg    aspirin EC tablet 81 mg    carvedilol tablet 25 mg    ceFAZolin (ANCEF) 1 gram in dextrose 5 % 50 mL IVPB (premix)    chlorhexidine 0.12 % solution 10 mL    diphenhydrAMINE capsule 25 mg    folic acid tablet 1 mg    furosemide tablet 40 mg    gabapentin capsule 800 mg    HYDROcodone-acetaminophen 5-325 mg per tablet 1 tablet    nozaseptin (NOZIN) nasal     nozaseptin (NOZIN) nasal     ondansetron injection 4 mg    oxyCODONE immediate release tablet 10 mg     pantoprazole EC tablet 40 mg    potassium chloride SA CR tablet 20 mEq    pravastatin tablet 80 mg    ramipril capsule 10 mg    sodium chloride 0.9% flush 10 mL    sodium chloride 0.9% flush 10 mL    Tdap vaccine injection 0.5 mL    traMADol tablet 50 mg     Objective:     Vital Signs (Most Recent):  Temp: 98.5 °F (36.9 °C) (07/28/19 0721)  Pulse: 61 (07/28/19 0955)  Resp: 15 (07/28/19 0721)  BP: 138/63 (07/28/19 0721)  SpO2: 96 % (07/28/19 0721) Vital Signs (24h Range):  Temp:  [97 °F (36.1 °C)-98.5 °F (36.9 °C)] 98.5 °F (36.9 °C)  Pulse:  [60-67] 61  Resp:  [15-22] 15  SpO2:  [95 %-100 %] 96 %  BP: (137-179)/(63-98) 138/63     Weight: 95.3 kg (210 lb 1.6 oz)  Height: 6' (182.9 cm)  Body mass index is 28.49 kg/m².      Intake/Output Summary (Last 24 hours) at 7/28/2019 1207  Last data filed at 7/27/2019 1700  Gross per 24 hour   Intake 1950 ml   Output 0 ml   Net 1950 ml       General    Constitutional: He is oriented to person, place, and time. He appears well-developed and well-nourished.   HENT:   Head: Atraumatic.   Neck: Normal range of motion.   Cardiovascular: Regular rhythm and intact distal pulses.    Pulmonary/Chest: Effort normal.   Abdominal: Soft.   Neurological: He is alert and oriented to person, place, and time.   Psychiatric: He has a normal mood and affect. His behavior is normal. Judgment and thought content normal.         Left Hand/Wrist Exam     Range of Motion   The patient has normal left hand/wrist ROM.    Other     Sensory Exam  Median Distribution: normal  Ulnar Distribution: normal  Radial Distribution: normal      Right Elbow Exam   Right elbow exam is normal.      Left Elbow Exam     Inspection   Deformity: absent    Pain   The patient exhibits pain of the medial epicondyle and olecranon    Tenderness   The patient is tender to palpation of the medial epicondyle.     Range of Motion   Extension: abnormal   Flexion: abnormal   Pronation: abnormal   Supination: abnormal      Comments:  Patient is postop day 1 from open reduction of open left elbow dislocation, irrigation and debridement and ulnar nerve decompression.  Full hand function is noted at this time and there is no neurologic deficit appreciated.  Pulses are intact radial and ulnar.  Swelling of the hand fingers and wrist is appreciated as expected. There is no evidence of compartment syndrome.  Patient remained stable in his splint.        Muscle Strength   Left Upper Extremity  Wrist extension: 5/5/5   Wrist flexion: 5/5/5   :  5/5/5   Pinch Mechanism: 5/5  Intrinsics: 5/5  EPL (Extensor Pollicis Longus): 5/5    Vascular Exam       Left Pulses      Radial:                    2+      Capillary Refill  Left Hand: normal capillary refill    Edema  Left Forearm: present      Significant Labs:   BMP:   Recent Labs   Lab 07/28/19  0445   *      K 4.0      CO2 23   BUN 11   CREATININE 1.1   CALCIUM 8.7     CBC:   Recent Labs   Lab 07/27/19  1156 07/28/19  0445   WBC 8.10 9.01   HGB 12.7* 11.3*   HCT 37.8* 34.3*    171     All pertinent labs within the past 24 hours have been reviewed.    Significant Imaging: X-Ray: I have reviewed all pertinent results/findings and my personal findings are:  X-rays taken in the operating room of the left elbow show a reduced elbow in acceptable position.    Assessment/Plan:     * Open dislocation of left elbow  Patient is seen in his function of his left upper extremity is quite good.  He has no neurologic deficit.  The outer dressing may be changed today prior to discharge.  I have recommended 7 days of Bactrim DS.  We have discussed pain medication today and the patient is orthopedically stable for discharge at the discretion of the hospitalist.  Follow-up will be in approximately 5 days with Dr. Olson.          Jules Clark, DO  Orthopedics  [facility]

## 2019-07-28 NOTE — PLAN OF CARE
Sw met with patient at bedside to complete assessment. Pt stated that he uses a CPAP at home. Patient denies any post hospital needs or services at this time. Pt's family will provide transportation upon discharge. Transitional Care Folder, Discharge Planning Begins on Admission pamphlet, [facility] Delivery pamphlet, Advance Directive information given to patient along with the contact information given. Sw placed contact information on white board. Sw instructed patient or family to call with any questions or concerns.    Pt's PCP is SAJAN Santana    [facility]:   [facility] [place], [place] - [address] [id number] [address] [address]  Phone: [phone] Fax: [phone]         07/28/19 1038   Discharge Assessment   Assessment Type Discharge Planning Assessment   Confirmed/corrected address and phone number on facesheet? Yes   Assessment information obtained from? Patient   Communicated expected length of stay with patient/caregiver yes   Prior to hospitilization cognitive status: Alert/Oriented   Prior to hospitalization functional status: Independent   Current cognitive status: Alert/Oriented   Current Functional Status: Independent   Facility Arrived From: Home   Lives With spouse   Able to Return to Prior Arrangements yes   Is patient able to care for self after discharge? Yes   Who are your caregiver(s) and their phone number(s)? Tammy Lew (wife) [phone]; Glory Porter (daughter) [phone]   Patient's perception of discharge disposition home or selfcare   Readmission Within the Last 30 Days no previous admission in last 30 days   Patient currently being followed by outpatient case management? No   Patient currently receives any other outside agency services? No   Equipment Currently Used at Home CPAP   Do you have any problems affording any of your prescribed medications? No   Is the patient taking medications as prescribed?  yes   Does the patient have transportation home? Yes   Transportation Anticipated family or friend will provide   Does the patient receive services at the [facility]? No   Discharge Plan A Home with family   Discharge Plan B Home with family   DME Needed Upon Discharge  none   Patient/Family in Agreement with Plan yes

## 2019-07-28 NOTE — PLAN OF CARE
Problem: Adult Inpatient Plan of Care  Goal: Plan of Care Review  Outcome: Ongoing (interventions implemented as appropriate)  No acute distress noted. Sling on LUE. Safety measures are in place. Call bell within reach. Pain being managed. Pt free of falls. Will continue to monitor. Chart check complete.

## 2019-07-28 NOTE — DISCHARGE SUMMARY
[facility] Hospital Medicine  Discharge Summary      Patient Name: Memo Lew  MRN: [id number]  Admission Date: 7/27/2019  Hospital Length of Stay: 1 days  Discharge Date and Time:  07/28/2019 5:15 PM  Attending Physician: Traci att. providers found   Discharging Provider: Delaney Tucker NP  Primary Care Provider: SAJAN Santana      HPI:   Memo Lew is a 68 year old male with history of essential hypertension, sarcoidosis, and crohn's disease who presented to ED with left elbow open fracture following a mechanical fall. He emergently went surgery for I&D, elbow ORIF, and ulnar nerve decompression performed by Dr. Clark. Surgery without acute complication. Hospital medicine has been consulted for admission. Patient is awake, but groggy. Complains of left arm heaviness postoperatively. Currently on supplemental oxygen. Oxygen saturation is stable.       Procedure(s) (LRB):  INCISION AND DRAINAGE (Left)  ORIF, ELBOW (Left)  DECOMPRESSION, NERVE, ULNAR (Left)      Hospital Course:   Memo Lew is a 68 year old male who was admitted to [facility] for open dislocation of left elbow. He underwent incision and drainage and ORIF of left elbow with ulnar nerve decompression. Surgery performed by Dr. Clark without acute complication. He was continued on IV abx for total of 24 hours. He was educated on weight bearing status. He will continue Bactrim x 7 days. He was asked to follow up with Dr. Olson on Friday. Patient seen and examined on date of discharge and deemed suitable.     Consults:   Consults (From admission, onward)        Status Ordering Provider     Inpatient consult to Hospitalist  Once     Provider:  Odalis Jonas MD    Acknowledged JULES CLARK     Inpatient consult to Orthopedic Surgery  Once     Provider:  Jules Clark DO    Acknowledged JULES CLARK          No new Assessment & Plan notes have been filed under this hospital service since the last  note was generated.  Service: Hospital Medicine    Final Active Diagnoses:    Diagnosis Date Noted POA    PRINCIPAL PROBLEM:  Open dislocation of left elbow [S53.105A, S51.002A] 07/27/2019 Unknown    PAF (paroxysmal atrial fibrillation) [I48.0] 07/27/2019 Yes    CAD (coronary artery disease) [I25.10] 07/27/2019 Yes    Essential hypertension [I10] 07/27/2019 Yes      Problems Resolved During this Admission:       Discharged Condition: stable    Disposition: Home or Self Care    Follow Up:  Follow-up Information     Geronimo Olson Jr, MD. Call in 1 week.    Specialty:  Orthopedic Surgery  Why:  For wound re-check  Contact information:  [address] [place] [address] DR Ori MCKEON [address] [phone]                 Patient Instructions:   No discharge procedures on file.    Significant Diagnostic Studies: Labs:   CMP   Recent Labs   Lab 07/27/19  1156 07/28/19  0445    139   K 3.8 4.0    105   CO2 26 23   * 128*   BUN 14 11   CREATININE 1.1 1.1   CALCIUM 9.3 8.7   PROT 6.2  --    ALBUMIN 3.7  --    BILITOT 0.9  --    ALKPHOS 47*  --    AST 25  --    ALT 20  --    ANIONGAP 9 11   ESTGFRAFRICA >60 >60   EGFRNONAA >60 >60    and CBC   Recent Labs   Lab 07/27/19  1156 07/28/19  0445   WBC 8.10 9.01   HGB 12.7* 11.3*   HCT 37.8* 34.3*    171       Pending Diagnostic Studies:     None         Medications:  Reconciled Home Medications:      Medication List      START taking these medications    HYDROcodone-acetaminophen 5-325 mg per tablet  Commonly known as:  NORCO  Take 1 tablet by mouth every 4 (four) hours as needed.     nozaseptin nasal   Commonly known as:  NOZIN  1 each by Each Nostril route On call Procedure (surgery).     sulfamethoxazole-trimethoprim 800-160mg 800-160 mg Tab  Commonly known as:  BACTRIM DS  Take 1 tablet by mouth 2 (two) times daily. for 7 days        CHANGE how you take these medications    apixaban 5 mg Tab  Commonly known as:  ELIQUIS  Take 1 tablet (5 mg  total) by mouth 2 (two) times daily.  Start taking on:  7/30/2019  What changed:  These instructions start on 7/30/2019. If you are unsure what to do until then, ask your doctor or other care provider.        CONTINUE taking these medications    amiodarone 200 MG Tab  Commonly known as:  PACERONE  Take by mouth once daily.     aspirin 81 MG EC tablet  Commonly known as:  ECOTRIN  Take 81 mg by mouth once daily.     carvedilol 25 MG tablet  Commonly known as:  COREG  Take 25 mg by mouth 2 (two) times daily with meals.     folic acid 1 MG tablet  Commonly known as:  FOLVITE  Take 1 mg by mouth once daily.     furosemide 40 MG tablet  Commonly known as:  LASIX  Take 40 mg by mouth 2 (two) times daily.     gabapentin 800 MG tablet  Commonly known as:  NEURONTIN  Take 800 mg by mouth 3 (three) times daily.     pantoprazole 40 MG tablet  Commonly known as:  PROTONIX  Take 40 mg by mouth once daily.     pravastatin 80 MG tablet  Commonly known as:  PRAVACHOL  Take 80 mg by mouth once daily.     ramipril 10 MG capsule  Commonly known as:  ALTACE  Take 10 mg by mouth once daily.     VITAMIN B COMP WITH VIT C NO.6 ORAL  Take by mouth.        STOP taking these medications    potassium chloride 20 mEq Pack  Commonly known as:  KLOR-CON            Indwelling Lines/Drains at time of discharge:   Lines/Drains/Airways          None          Time spent on the discharge of patient: >35 minutes  Patient was seen and examined on the date of discharge and determined to be suitable for discharge.      Delaney Tucker NP  Department of Hospital Medicine  [facility]

## 2019-07-28 NOTE — ASSESSMENT & PLAN NOTE
Patient is seen in his function of his left upper extremity is quite good.  He has no neurologic deficit.  The outer dressing may be changed today prior to discharge.  I have recommended 7 days of Bactrim DS.  We have discussed pain medication today and the patient is orthopedically stable for discharge at the discretion of the hospitalist.  Follow-up will be in approximately 5 days with Dr. Olson.

## 2019-07-29 ENCOUNTER — TELEPHONE (OUTPATIENT)
Dept: ORTHOPEDICS | Facility: CLINIC | Age: 69
End: 2019-07-29

## 2019-07-30 ENCOUNTER — HOSPITAL ENCOUNTER (EMERGENCY)
Facility: HOSPITAL | Age: 69
Discharge: HOME | End: 2019-07-30
Attending: EMERGENCY MEDICINE
Payer: COMMERCIAL

## 2019-07-30 ENCOUNTER — TELEPHONE (OUTPATIENT)
Dept: ORTHOPEDICS | Facility: CLINIC | Age: 69
End: 2019-07-30

## 2019-07-30 VITALS
OXYGEN SATURATION: 98 % | DIASTOLIC BLOOD PRESSURE: 62 MMHG | WEIGHT: 230.19 LBS | HEART RATE: 64 BPM | TEMPERATURE: 98 F | HEIGHT: 71 IN | RESPIRATION RATE: 20 BRPM | BODY MASS INDEX: 32.23 KG/M2 | SYSTOLIC BLOOD PRESSURE: 115 MMHG

## 2019-07-30 DIAGNOSIS — M79.89 LEFT ARM SWELLING: ICD-10-CM

## 2019-07-30 DIAGNOSIS — R53.1 WEAKNESS: ICD-10-CM

## 2019-07-30 DIAGNOSIS — G89.18 PAIN AT SURGICAL SITE: Primary | ICD-10-CM

## 2019-07-30 LAB
ALBUMIN SERPL BCP-MCNC: 3 G/DL (ref 3.5–5.2)
ALP SERPL-CCNC: 52 U/L (ref 55–135)
ALT SERPL W/O P-5'-P-CCNC: 21 U/L (ref 10–44)
ANION GAP SERPL CALC-SCNC: 8 MMOL/L (ref 8–16)
AST SERPL-CCNC: 32 U/L (ref 10–40)
BASOPHILS # BLD AUTO: 0.02 K/UL (ref 0–0.2)
BASOPHILS NFR BLD: 0.3 % (ref 0–1.9)
BILIRUB SERPL-MCNC: 0.7 MG/DL (ref 0.1–1)
BUN SERPL-MCNC: 17 MG/DL (ref 8–23)
CALCIUM SERPL-MCNC: 9.1 MG/DL (ref 8.7–10.5)
CHLORIDE SERPL-SCNC: 103 MMOL/L (ref 95–110)
CO2 SERPL-SCNC: 27 MMOL/L (ref 23–29)
CREAT SERPL-MCNC: 1.2 MG/DL (ref 0.5–1.4)
DIFFERENTIAL METHOD: ABNORMAL
EOSINOPHIL # BLD AUTO: 0.2 K/UL (ref 0–0.5)
EOSINOPHIL NFR BLD: 2.3 % (ref 0–8)
ERYTHROCYTE [DISTWIDTH] IN BLOOD BY AUTOMATED COUNT: 14.1 % (ref 11.5–14.5)
EST. GFR  (AFRICAN AMERICAN): >60 ML/MIN/1.73 M^2
EST. GFR  (NON AFRICAN AMERICAN): >60 ML/MIN/1.73 M^2
GLUCOSE SERPL-MCNC: 115 MG/DL (ref 70–110)
HCT VFR BLD AUTO: 28 % (ref 40–54)
HGB BLD-MCNC: 9.2 G/DL (ref 14–18)
LACTATE SERPL-SCNC: 1 MMOL/L (ref 0.5–2.2)
LYMPHOCYTES # BLD AUTO: 1.3 K/UL (ref 1–4.8)
LYMPHOCYTES NFR BLD: 18.1 % (ref 18–48)
MCH RBC QN AUTO: 31.1 PG (ref 27–31)
MCHC RBC AUTO-ENTMCNC: 32.9 G/DL (ref 32–36)
MCV RBC AUTO: 95 FL (ref 82–98)
MONOCYTES # BLD AUTO: 0.8 K/UL (ref 0.3–1)
MONOCYTES NFR BLD: 10.1 % (ref 4–15)
NEUTROPHILS # BLD AUTO: 5.1 K/UL (ref 1.8–7.7)
NEUTROPHILS NFR BLD: 69.3 % (ref 38–73)
PLATELET # BLD AUTO: 170 K/UL (ref 150–350)
PMV BLD AUTO: 11.3 FL (ref 9.2–12.9)
POTASSIUM SERPL-SCNC: 4.2 MMOL/L (ref 3.5–5.1)
PROT SERPL-MCNC: 5.7 G/DL (ref 6–8.4)
RBC # BLD AUTO: 2.96 M/UL (ref 4.6–6.2)
SODIUM SERPL-SCNC: 138 MMOL/L (ref 136–145)
WBC # BLD AUTO: 7.42 K/UL (ref 3.9–12.7)

## 2019-07-30 PROCEDURE — 80053 COMPREHEN METABOLIC PANEL: CPT

## 2019-07-30 PROCEDURE — 93010 EKG 12-LEAD: ICD-10-PCS | Mod: ,,, | Performed by: INTERNAL MEDICINE

## 2019-07-30 PROCEDURE — 99285 EMERGENCY DEPT VISIT HI MDM: CPT | Mod: 25

## 2019-07-30 PROCEDURE — 36415 COLL VENOUS BLD VENIPUNCTURE: CPT

## 2019-07-30 PROCEDURE — 93005 ELECTROCARDIOGRAM TRACING: CPT

## 2019-07-30 PROCEDURE — 83605 ASSAY OF LACTIC ACID: CPT

## 2019-07-30 PROCEDURE — 85025 COMPLETE CBC W/AUTO DIFF WBC: CPT

## 2019-07-30 PROCEDURE — 87040 BLOOD CULTURE FOR BACTERIA: CPT

## 2019-07-30 PROCEDURE — 93010 ELECTROCARDIOGRAM REPORT: CPT | Mod: ,,, | Performed by: INTERNAL MEDICINE

## 2019-07-30 PROCEDURE — 36000 PLACE NEEDLE IN VEIN: CPT

## 2019-07-30 PROCEDURE — 29105 APPLICATION LONG ARM SPLINT: CPT

## 2019-07-30 NOTE — CONSULTS
"[facility]  Orthopedics  Consult Note    Patient Name: Memo Lew  MRN: [id number]  Admission Date: 7/30/2019  Hospital Length of Stay: 0 days  Attending Provider: Jose Armando Paredes MD  Primary Care Provider: SAJAN Santana    Patient information was obtained from patient and ER records.     Consults  Subjective:     History of Present Illness: Memo Lew is a 68 y.o. male patient with PMHx of Crohn's disease, HTN, and sarcoidosis who presents to the Emergency Department for a post-op problem which onset gradually today. Pt was seen at this facility on Saturday following a fall. Pt was admitted to this facility and had surgery that day to repair the pt's open L elbow dislocation. Pt reports that he stopped taking his blood thinners as instructed, but the wound has been bleeding since the pt was d/c from this facility. Pt's family reports that the pt is "soaking through" the bandages and towels at home. Pt restarted his blood thinners today. Pt presents to the ED for evaluation of increased pain and swelling to the LUE which onset this morning. Symptoms are constant and moderate in severity. Pt's pain is worse with movement and palpation. No mitigating factors reported. No associated sxs reported. Patient denies any fever, chills, CP, SOB, N/V/D, abd pain, back pain, neck pain, HA, dizziness, and all other sxs at this time. No prior Tx reported. No further complaints or concerns at this time.   Principal Problem:<principal problem not specified>    Chief Complaint:   Chief Complaint   Patient presents with    Post-op Problem     L arm/hand swelling, pt first noticed the swelling this morning when he woke up; reports having surgery to L elbow on saturday         HPI:  68-year-old male fell 4 days ago sustaining an open dislocation of his left elbow.  He was taken on the day of injury to the OR where he underwent washout with 9 L of saline followed by open reduction and wound closure. He does " take Eliquis and has had bloody drainage from the incision. Patient did speak with Dr. Clark yesterday and he was scheduled to see me tomorrow in clinic for a dressing change.  This morning he was noted to have increasing swelling in the fingers and there was concern for continued bleeding.  The accordingly presented to the ED for follow-up.  Per instructions of his cardiologist he has restarted his Eliquis.  He denies fever sweats or chills.  He does note numbness in the little finger, however the op note does show that he had an ulnar nerve decompression due to stretching of the nerve at the time of injury.    Past Medical History:   Diagnosis Date    Crohn's disease     Hypertension     Sarcoidosis        Past Surgical History:   Procedure Laterality Date    CARDIAC PACEMAKER PLACEMENT      CARDIAC SURGERY      DECOMPRESSION, NERVE, ULNAR Left 7/27/2019    [facility]    INCISION AND DRAINAGE Left 7/27/2019    [facility]    REDUCTION-OPEN Left 7/27/2019    [facility] by [facility] at [facility]       Review of patient's allergies indicates:   Allergen Reactions    Infliximab Anaphylaxis    Iodine and iodide containing products Rash     Patient states severe rash similar to a sunburn experienced after CT contrast. Patient with prior history of pre-meds before any contrast studies.      Mercaptopurine analogues (thiopurines)        Current Facility-Administered Medications   Medication    sodium chloride 0.9% bolus 3,132 mL     Current Outpatient Medications   Medication Sig    amiodarone (PACERONE) 200 MG Tab Take by mouth once daily.    apixaban (ELIQUIS) 5 mg Tab Take 1 tablet (5 mg total) by mouth 2 (two) times daily.    aspirin (ECOTRIN) 81 MG EC tablet Take 81 mg by mouth once daily.    carvedilol (COREG) 25 MG tablet Take 25 mg by mouth 2 (two) times daily with meals.    folic acid (FOLVITE) 1 MG tablet Take 1 mg by  "mouth once daily.    furosemide (LASIX) 40 MG tablet Take 40 mg by mouth 2 (two) times daily.    gabapentin (NEURONTIN) 800 MG tablet Take 800 mg by mouth 3 (three) times daily.    HYDROcodone-acetaminophen (NORCO) 5-325 mg per tablet Take 1 tablet by mouth every 4 (four) hours as needed.    nozaseptin (NOZIN) nasal  1 each by Each Nostril route On call Procedure (surgery).    pantoprazole (PROTONIX) 40 MG tablet Take 40 mg by mouth once daily.    pravastatin (PRAVACHOL) 80 MG tablet Take 80 mg by mouth once daily.    ramipril (ALTACE) 10 MG capsule Take 10 mg by mouth once daily.    sulfamethoxazole-trimethoprim 800-160mg (BACTRIM DS) 800-160 mg Tab Take 1 tablet by mouth 2 (two) times daily. for 7 days    vitamin B comp and vit C no.6 (VITAMIN B COMP WITH VIT C NO.6 ORAL) Take by mouth.     Family History     None        Tobacco Use    Smoking status: Former Smoker    Smokeless tobacco: Never Used   Substance and Sexual Activity    Alcohol use: Not Currently    Drug use: Never    Sexual activity: Not on file     ROS  Objective:     Vital Signs (Most Recent):  Temp: 97.6 °F (36.4 °C) (07/30/19 0758)  Pulse: 66 (07/30/19 0758)  Resp: 20 (07/30/19 0758)  BP: (!) 102/51 (07/30/19 0758)  SpO2: 98 % (07/30/19 0758) Vital Signs (24h Range):  Temp:  [97.6 °F (36.4 °C)] 97.6 °F (36.4 °C)  Pulse:  [66] 66  Resp:  [20] 20  SpO2:  [98 %] 98 %  BP: (102)/(51) 102/51     Weight: 104.4 kg (230 lb 2.6 oz)  Height: 5' 11" (180.3 cm)  Body mass index is 32.1 kg/m².    No intake or output data in the 24 hours ending 07/30/19 0843    Ortho/SPM Exam   Left upper extremity:  The dressing and splints are removed. There is a jagged medial incision centered over the elbow which is clean and dry. No evidence of active bleeding at this time. There is no significant erythema or induration indicative of infection.  No purulent drainage. Patient does have pain in the elbow with passive motion however the elbow is stable. " Fingers are pink and warm with good capillary refill.  There is mild swelling of all fingers.  Patient has good  and pinch strength and can extend the wrist and thumb.  There is slight decreased sensation in the ulnar nerve distribution.  Pulses are full and equal applied.    Significant Labs:   Recent Lab Results     None        All pertinent labs within the past 24 hours have been reviewed.    Significant Imaging: I have reviewed all pertinent imaging results/findings.    Assessment/Plan:  Status post I&D and open reduction of open dislocation left elbow, doing well. Residual drainage most likely secondary to resolving hematoma inpatient on [facility].  Plan:  Reapply dressing and splints.  Patient needs a long-arm splint as well as a sugar-tong splint holding the forearm in slight supination.     There are no hospital problems to display for this patient.      Thank you for your consult. Patient needs follow-up appointment in my office on Friday for repeat dressing change and splint change.    Geronimo Olson Jr, MD  Orthopedics  [facility]

## 2019-07-30 NOTE — ED PROVIDER NOTES
"SCRIBE #1 NOTE: I, Corinne Mack, am scribing for, and in the presence of, Jose Armando Paredes MD. I have scribed the entire note.      History      Chief Complaint   Patient presents with    Post-op Problem     L arm/hand swelling, pt first noticed the swelling this morning when he woke up; reports having surgery to L elbow on saturday        Review of patient's allergies indicates:   Allergen Reactions    Infliximab Anaphylaxis    Iodine and iodide containing products Rash     Patient states severe rash similar to a sunburn experienced after CT contrast. Patient with prior history of pre-meds before any contrast studies.      Mercaptopurine analogues (thiopurines)         HPI   HPI    7/30/2019, 8:06 AM   History obtained from the patient      History of Present Illness: Memo Lew is a 68 y.o. male patient with PMHx of Crohn's disease, HTN, and sarcoidosis who presents to the Emergency Department for a post-op problem which onset gradually today. Pt was seen at this facility on Saturday following a fall. Pt was admitted to this facility and had surgery that day to repair the pt's open L elbow dislocation. Pt reports that he stopped taking his blood thinners as instructed, but the wound has been bleeding since the pt was d/c from this facility. Pt's family reports that the pt is "soaking through" the bandages and towels at home. Pt restarted his blood thinners today. Pt presents to the ED for evaluation of increased pain and swelling to the LUE which onset this morning. Symptoms are constant and moderate in severity. Pt's pain is worse with movement and palpation. No mitigating factors reported. No associated sxs reported. Patient denies any fever, chills, CP, SOB, N/V/D, abd pain, back pain, neck pain, HA, dizziness, and all other sxs at this time. No prior Tx reported. No further complaints or concerns at this time.         Arrival mode: Personal vehicle    PCP: SAJAN Santana       Past Medical " History:  Past Medical History:   Diagnosis Date    Crohn's disease     Hypertension     Sarcoidosis        Past Surgical History:  Past Surgical History:   Procedure Laterality Date    CARDIAC PACEMAKER PLACEMENT      CARDIAC SURGERY      DECOMPRESSION, NERVE, ULNAR Left 7/27/2019    [facility]    INCISION AND DRAINAGE Left 7/27/2019    [facility] by [facility] at [facility]    REDUCTION-OPEN Left 7/27/2019    Performed by [facility] at [facility]         Family History:  History reviewed. No pertinent family history.    Social History:  Social History     Tobacco Use    Smoking status: Former Smoker    Smokeless tobacco: Never Used   Substance and Sexual Activity    Alcohol use: Not Currently    Drug use: Never    Sexual activity: Unknown       ROS   Review of Systems   Constitutional: Negative for chills and fever.   Respiratory: Negative for cough and shortness of breath.    Cardiovascular: Negative for chest pain and leg swelling.   Gastrointestinal: Negative for abdominal pain, diarrhea, nausea and vomiting.   Musculoskeletal: Positive for arthralgias (L elbow) and joint swelling (L elbow). Negative for back pain, neck pain and neck stiffness.   Skin: Positive for wound (surgical; L elbow). Negative for rash.   Neurological: Negative for dizziness, light-headedness, numbness and headaches.   All other systems reviewed and are negative.      Physical Exam      Initial Vitals [07/30/19 0758]   BP Pulse Resp Temp SpO2   (!) 102/51 66 20 97.6 °F (36.4 °C) 98 %      MAP       --          Physical Exam  Nursing Notes and Vital Signs Reviewed.  Constitutional: Patient is in no acute distress. Well-developed and well-nourished.  Head: Atraumatic. Normocephalic.  Eyes: PERRL. EOM intact. Conjunctivae are not pale. No scleral icterus.  ENT: Mucous membranes are moist. Oropharynx is clear and symmetric.    Neck: Supple. Full ROM. No  "lymphadenopathy.  Cardiovascular: Regular rate. Regular rhythm. No murmurs, rubs, or gallops. Distal pulses are 2+ and symmetric.  Pulmonary/Chest: No respiratory distress. Clear to auscultation bilaterally. No wheezing or rales.  Abdominal: Soft and non-distended.  There is no tenderness.  No rebound, guarding, or rigidity.   Musculoskeletal: Moves all extremities. No obvious deformities. No edema. Swelling to the L arm and L hand. Sutures are in place to the L elbow and appear clean, dry, and intact.  Skin: Warm and dry.  Neurological:  Alert, awake, and appropriate.  Normal speech.  No acute focal neurological deficits are appreciated.  Psychiatric: Normal affect. Good eye contact. Appropriate in content.    ED Course    Procedures  ED Vital Signs:  Vitals:    07/30/19 0758 07/30/19 0936 07/30/19 0937 07/30/19 0938   BP: (!) 102/51 (!) 100/57 (!) 100/57 110/61   Pulse: 66 66 70 67   Resp: 20      Temp: 97.6 °F (36.4 °C)      TempSrc: Oral      SpO2: 98%  96% 98%   Weight: 104.4 kg (230 lb 2.6 oz)      Height: 5' 11" (1.803 m)       07/30/19 0939   BP: 115/62   Pulse: 64   Resp:    Temp:    TempSrc:    SpO2: 98%   Weight:    Height:        Abnormal Lab Results:  Labs Reviewed   CBC W/ AUTO DIFFERENTIAL - Abnormal; Notable for the following components:       Result Value    RBC 2.96 (*)     Hemoglobin 9.2 (*)     Hematocrit 28.0 (*)     Mean Corpuscular Hemoglobin 31.1 (*)     All other components within normal limits   COMPREHENSIVE METABOLIC PANEL - Abnormal; Notable for the following components:    Glucose 115 (*)     Total Protein 5.7 (*)     Albumin 3.0 (*)     Alkaline Phosphatase 52 (*)     All other components within normal limits   CULTURE, BLOOD   CULTURE, BLOOD   LACTIC ACID, PLASMA        All Lab Results:  Results for orders placed or performed during the hospital encounter of 07/30/19   CBC auto differential   Result Value Ref Range    WBC 7.42 3.90 - 12.70 K/uL    RBC 2.96 (L) 4.60 - 6.20 M/uL    " Hemoglobin 9.2 (L) 14.0 - 18.0 g/dL    Hematocrit 28.0 (L) 40.0 - 54.0 %    Mean Corpuscular Volume 95 82 - 98 fL    Mean Corpuscular Hemoglobin 31.1 (H) 27.0 - 31.0 pg    Mean Corpuscular Hemoglobin Conc 32.9 32.0 - 36.0 g/dL    RDW 14.1 11.5 - 14.5 %    Platelets 170 150 - 350 K/uL    MPV 11.3 9.2 - 12.9 fL    Gran # (ANC) 5.1 1.8 - 7.7 K/uL    Lymph # 1.3 1.0 - 4.8 K/uL    Mono # 0.8 0.3 - 1.0 K/uL    Eos # 0.2 0.0 - 0.5 K/uL    Baso # 0.02 0.00 - 0.20 K/uL    Gran% 69.3 38.0 - 73.0 %    Lymph% 18.1 18.0 - 48.0 %    Mono% 10.1 4.0 - 15.0 %    Eosinophil% 2.3 0.0 - 8.0 %    Basophil% 0.3 0.0 - 1.9 %    Differential Method Automated    Comprehensive metabolic panel   Result Value Ref Range    Sodium 138 136 - 145 mmol/L    Potassium 4.2 3.5 - 5.1 mmol/L    Chloride 103 95 - 110 mmol/L    CO2 27 23 - 29 mmol/L    Glucose 115 (H) 70 - 110 mg/dL    BUN, Bld 17 8 - 23 mg/dL    Creatinine 1.2 0.5 - 1.4 mg/dL    Calcium 9.1 8.7 - 10.5 mg/dL    Total Protein 5.7 (L) 6.0 - 8.4 g/dL    Albumin 3.0 (L) 3.5 - 5.2 g/dL    Total Bilirubin 0.7 0.1 - 1.0 mg/dL    Alkaline Phosphatase 52 (L) 55 - 135 U/L    AST 32 10 - 40 U/L    ALT 21 10 - 44 U/L    Anion Gap 8 8 - 16 mmol/L    eGFR if African American >60 >60 mL/min/1.73 m^2    eGFR if non African American >60 >60 mL/min/1.73 m^2   Lactic acid, plasma #1   Result Value Ref Range    Lactate (Lactic Acid) 1.0 0.5 - 2.2 mmol/L       Imaging Results:  Imaging Results          X-Ray Chest AP Portable (Final result)  Result time 07/30/19 08:29:39    Final result by Jack Watts MD (07/30/19 08:29:39)                 Impression:      No acute findings.      Electronically signed by: Jack Watts MD  Date:    07/30/2019  Time:    08:29             Narrative:    EXAMINATION:  XR CHEST AP PORTABLE    CLINICAL HISTORY:  Cardiac arrhythmia., Sepsis;    COMPARISON:  07/27/2019.    FINDINGS:  Postoperative changes.  Left pacemaker.  Cardiac size within the range of normal.   Aortic atherosclerosis.    Clear lungs.                                 The EKG was ordered, reviewed, and independently interpreted by the ED provider.  Interpretation time: 0844  Rate: 61 BPM  Rhythm: Atrial-paced rhythm  Interpretation: L anterior fascicular block. No STEMI.             The Emergency Provider reviewed the vital signs and test results, which are outlined above.    ED Discussion     8:19 AM: Dr. Paredes discussed the pt's case with Dr. Olson (Orthopedic Surgery) who will see the pt.    8:21 AM: Dr. Olson (Orthopedic Surgery) is at the pt bedside.    8:37 AM: Dr. Paredes discussed the pt's case with Dr. Olson (Orthopedic Surgery) who recommends having the pt f/u with him on Friday if the pt's work-up is benign as the pt's wound does not appear infected upon Dr. Olson's examination.    9:50 AM: Reassessed pt at this time. Pt is awake, alert, and in no distress. Discussed with pt all pertinent ED information and results. Discussed pt dx and plan of tx. Gave pt all f/u and return to the ED instructions. All questions and concerns were addressed at this time. Pt expresses understanding of information and instructions, and is comfortable with plan to discharge. Pt is stable for discharge.    I discussed with patient and/or family/caretaker that evaluation in the ED does not suggest any emergent or life threatening medical conditions requiring immediate intervention beyond what was provided in the ED, and I believe patient is safe for discharge.  Regardless, an unremarkable evaluation in the ED does not preclude the development or presence of a serious of life threatening condition. As such, patient was instructed to return immediately for any worsening or change in current symptoms.    I discussed wound care precautions with patient and/or family/caretaker; specifically that all wounds have risk of infection despite efforts to cleanse and debride the wound; and there is a risk of an occult  foreign body (and thus increased risk of infection) despite a negative examination.  I discussed with patient need to return for any signs of infection, specifically redness, increased pain, fever, drainage of pus, or any concern, immediately.      ED Medication(s):  Medications   sodium chloride 0.9% bolus 3,132 mL (0 mLs Intravenous Hold 7/30/19 0815)          Medication List      ASK your doctor about these medications    amiodarone 200 MG Tab  Commonly known as:  PACERONE     apixaban 5 mg Tab  Commonly known as:  ELIQUIS  Take 1 tablet (5 mg total) by mouth 2 (two) times daily.     aspirin 81 MG EC tablet  Commonly known as:  ECOTRIN     carvedilol 25 MG tablet  Commonly known as:  COREG     folic acid 1 MG tablet  Commonly known as:  FOLVITE     furosemide 40 MG tablet  Commonly known as:  LASIX     gabapentin 800 MG tablet  Commonly known as:  NEURONTIN     HYDROcodone-acetaminophen 5-325 mg per tablet  Commonly known as:  NORCO  Take 1 tablet by mouth every 4 (four) hours as needed.     nozaseptin nasal   Commonly known as:  NOZIN  1 each by Each Nostril route On call Procedure (surgery).     pantoprazole 40 MG tablet  Commonly known as:  PROTONIX     pravastatin 80 MG tablet  Commonly known as:  PRAVACHOL     ramipril 10 MG capsule  Commonly known as:  ALTACE     sulfamethoxazole-trimethoprim 800-160mg 800-160 mg Tab  Commonly known as:  BACTRIM DS  Take 1 tablet by mouth 2 (two) times daily. for 7 days     VITAMIN B COMP WITH VIT C NO.6 ORAL            Follow-up Information     Geronimo Olson Jr, MD In 3 days.    Specialty:  Orthopedic Surgery  Contact information:  [address] [place] [address] [address] Ori [place] [id number] [phone]                     Medical Decision Making    Medical Decision Making:   Clinical Tests:   Lab Tests: Ordered and Reviewed  Radiological Study: Ordered and Reviewed  Medical Tests: Ordered and Reviewed           Scribe Attestation:   Scribe #1: I performed the above  scribed service and the documentation accurately describes the services I performed. I attest to the accuracy of the note 07/30/2019.    Attending:   Physician Attestation Statement for Scribe #1: I, Jose Armando Paredes MD, personally performed the services described in this documentation, as scribed by Corinne Mack, in my presence, and it is both accurate and complete.          Clinical Impression       ICD-10-CM ICD-9-CM   1. Pain at surgical site G89.18 338.18   2. Weakness R53.1 780.79   3. Left arm swelling M79.89 729.81       Disposition:   Disposition: Discharged  Condition: Stable           Jose Armando Paredes MD  07/30/19 1002

## 2019-08-02 ENCOUNTER — OFFICE VISIT (OUTPATIENT)
Dept: ORTHOPEDICS | Facility: CLINIC | Age: 69
End: 2019-08-02
Payer: COMMERCIAL

## 2019-08-02 ENCOUNTER — HOSPITAL ENCOUNTER (OUTPATIENT)
Dept: RADIOLOGY | Facility: HOSPITAL | Age: 69
Discharge: HOME | End: 2019-08-02
Attending: ORTHOPAEDIC SURGERY
Payer: COMMERCIAL

## 2019-08-02 VITALS
HEIGHT: 71 IN | SYSTOLIC BLOOD PRESSURE: 138 MMHG | HEART RATE: 70 BPM | DIASTOLIC BLOOD PRESSURE: 73 MMHG | BODY MASS INDEX: 32.23 KG/M2 | WEIGHT: 230.19 LBS

## 2019-08-02 DIAGNOSIS — S51.002D OPEN DISLOCATION OF LEFT ELBOW, SUBSEQUENT ENCOUNTER: Primary | ICD-10-CM

## 2019-08-02 DIAGNOSIS — S53.105D OPEN DISLOCATION OF LEFT ELBOW, SUBSEQUENT ENCOUNTER: Primary | ICD-10-CM

## 2019-08-02 DIAGNOSIS — M25.522 LEFT ELBOW PAIN: ICD-10-CM

## 2019-08-02 DIAGNOSIS — M25.522 LEFT ELBOW PAIN: Primary | ICD-10-CM

## 2019-08-02 LAB
BACTERIA BLD CULT: NORMAL
BACTERIA BLD CULT: NORMAL

## 2019-08-02 PROCEDURE — 99999 PR PBB SHADOW E&M-EST. PATIENT-LVL III: ICD-10-PCS | Mod: PBBFAC,,, | Performed by: ORTHOPAEDIC SURGERY

## 2019-08-02 PROCEDURE — 99999 PR PBB SHADOW E&M-EST. PATIENT-LVL III: CPT | Mod: PBBFAC,,, | Performed by: ORTHOPAEDIC SURGERY

## 2019-08-02 PROCEDURE — 73070 X-RAY EXAM OF ELBOW: CPT | Mod: TC,LT

## 2019-08-02 PROCEDURE — 73070 X-RAY EXAM OF ELBOW: CPT | Mod: 26,LT,, | Performed by: RADIOLOGY

## 2019-08-02 PROCEDURE — 73070 XR ELBOW 2 VIEWS LEFT: ICD-10-PCS | Mod: 26,LT,, | Performed by: RADIOLOGY

## 2019-08-02 RX ORDER — HYDROCODONE BITARTRATE AND ACETAMINOPHEN 5; 325 MG/1; MG/1
1 TABLET ORAL EVERY 4 HOURS PRN
Qty: 30 TABLET | Refills: 0 | Status: ON HOLD | OUTPATIENT
Start: 2019-08-02 | End: 2023-08-02 | Stop reason: HOSPADM

## 2019-08-02 NOTE — PROGRESS NOTES
Subjective:     Patient ID: Memo Lew is a 68 y.o. male.    Chief Complaint: Post-op Evaluation and Pain of the Left Elbow      HPI:  68-year-old is 6 days post left elbow dislocation and open reduction.  He presents today for x-ray review, surgical wound check and dressing change. Remains on Eliquis per Cardiologist.    Past Medical History:   Diagnosis Date    Crohn's disease     Hypertension     Sarcoidosis      Past Surgical History:   Procedure Laterality Date    CARDIAC PACEMAKER PLACEMENT      CARDIAC SURGERY      DECOMPRESSION, NERVE, ULNAR Left 7/27/2019    [facility]    INCISION AND DRAINAGE Left 7/27/2019    [facility]    REDUCTION-OPEN Left 7/27/2019    [facility] by [facility] at [facility]     Review of patient's allergies indicates:   Allergen Reactions    Infliximab Anaphylaxis    Iodine and iodide containing products Rash     Patient states severe rash similar to a sunburn experienced after CT contrast. Patient with prior history of pre-meds before any contrast studies.      Mercaptopurine analogues (thiopurines)       Review of systems showed no pertinent positives      Objective:     Vitals:    08/02/19 1520   BP: 138/73   Pulse: 70      General    Constitutional: He is oriented to person, place, and time. He appears well-developed and well-nourished.   HENT:   Head: Normocephalic and atraumatic.   Eyes: Conjunctivae are normal.   Neck: Normal range of motion.   Pulmonary/Chest: No respiratory distress.   Neurological: He is alert and oriented to person, place, and time.   Psychiatric: He has a normal mood and affect.         Right Elbow Exam     Range of Motion   The patient has normal right elbow ROM.      Left Elbow Exam     Inspection   Scars: present  Bruising: present      medial wound healing well, clean and dry with minimal serosanguinous drainage  Limited ROM         Assessment:     Encounter  Diagnosis   Name Primary?    Open dislocation of left elbow, subsequent encounter Yes         Plan:     The patient will follow up in 5 days for a dressing change and wound recheck.  With possible suture removal.

## 2019-08-04 LAB
BACTERIA BLD CULT: NORMAL
BACTERIA BLD CULT: NORMAL

## 2019-08-07 ENCOUNTER — OFFICE VISIT (OUTPATIENT)
Dept: ORTHOPEDICS | Facility: CLINIC | Age: 69
End: 2019-08-07
Payer: COMMERCIAL

## 2019-08-07 ENCOUNTER — HOSPITAL ENCOUNTER (OUTPATIENT)
Dept: RADIOLOGY | Facility: HOSPITAL | Age: 69
Discharge: HOME | End: 2019-08-07
Attending: ORTHOPAEDIC SURGERY
Payer: COMMERCIAL

## 2019-08-07 VITALS
HEIGHT: 71 IN | WEIGHT: 230.19 LBS | HEART RATE: 79 BPM | BODY MASS INDEX: 32.23 KG/M2 | DIASTOLIC BLOOD PRESSURE: 70 MMHG | TEMPERATURE: 98 F | SYSTOLIC BLOOD PRESSURE: 121 MMHG

## 2019-08-07 DIAGNOSIS — S53.105D OPEN DISLOCATION OF LEFT ELBOW, SUBSEQUENT ENCOUNTER: Primary | ICD-10-CM

## 2019-08-07 DIAGNOSIS — M25.522 LEFT ELBOW PAIN: ICD-10-CM

## 2019-08-07 DIAGNOSIS — S51.002D OPEN DISLOCATION OF LEFT ELBOW, SUBSEQUENT ENCOUNTER: Primary | ICD-10-CM

## 2019-08-07 PROCEDURE — 73070 XR ELBOW 2 VIEWS LEFT: ICD-10-PCS | Mod: 26,LT,, | Performed by: RADIOLOGY

## 2019-08-07 PROCEDURE — 99999 PR PBB SHADOW E&M-EST. PATIENT-LVL IV: CPT | Mod: PBBFAC,,, | Performed by: SPECIALIST

## 2019-08-07 PROCEDURE — 73070 X-RAY EXAM OF ELBOW: CPT | Mod: 26,LT,, | Performed by: RADIOLOGY

## 2019-08-07 PROCEDURE — 99999 PR PBB SHADOW E&M-EST. PATIENT-LVL IV: ICD-10-PCS | Mod: PBBFAC,,, | Performed by: SPECIALIST

## 2019-08-07 PROCEDURE — 73070 X-RAY EXAM OF ELBOW: CPT | Mod: TC,LT

## 2019-08-07 RX ORDER — EZETIMIBE 10 MG/1
10 TABLET ORAL DAILY
Refills: 3 | COMMUNITY
Start: 2019-07-31

## 2019-08-07 RX ORDER — MESALAMINE 500 MG/1
CAPSULE ORAL 3 TIMES DAILY
Refills: 2 | Status: ON HOLD | COMMUNITY
Start: 2019-07-09 | End: 2023-08-02

## 2019-08-07 RX ORDER — FLUTICASONE PROPIONATE 50 MCG
SPRAY, SUSPENSION (ML) NASAL DAILY PRN
Refills: 5 | COMMUNITY
Start: 2019-07-10

## 2019-08-07 RX ORDER — POTASSIUM CHLORIDE 20 MEQ/1
20 TABLET, EXTENDED RELEASE ORAL 2 TIMES DAILY
Refills: 6 | COMMUNITY
Start: 2019-07-08

## 2019-08-07 NOTE — PROGRESS NOTES
Subjective:     Patient ID: Memo Lew is a 68 y.o. male.    Chief Complaint: Post-op Evaluation and Pain of the Left Elbow      HPI: Patient Returns to clinic for wound recheck and suture removal post-op from previous Left open elbow dislocation. He appears in clinic wearing his splint.    Past Medical History:   Diagnosis Date    Crohn's disease     Hypertension     Sarcoidosis      Past Surgical History:   Procedure Laterality Date    CARDIAC PACEMAKER PLACEMENT      CARDIAC SURGERY      DECOMPRESSION, NERVE, ULNAR Left 7/27/2019    Performed by Jules Clark DO at [facility] OR    INCISION AND DRAINAGE Left 7/27/2019    Performed by Jules Clark DO at [facility] OR    REDUCTION-OPEN Left 7/27/2019    Performed by Jules Clark DO at [facility] OR     Review of patient's allergies indicates:   Allergen Reactions    Infliximab Anaphylaxis    Iodine and iodide containing products Rash     Patient states severe rash similar to a sunburn experienced after CT contrast. Patient with prior history of pre-meds before any contrast studies.      Mercaptopurine analogues (thiopurines)       Review of Systems   Constitutional: Negative for fever.   Musculoskeletal: Negative for joint pain.   Skin: Negative for itching.       Patient reports decreased pain since last visit.       Objective:     Vitals:    08/07/19 0913   BP: 121/70   Pulse: 79   Temp: 98.1 °F (36.7 °C)      General    Constitutional: He is oriented to person, place, and time. He appears well-developed.   HENT:   Head: Normocephalic and atraumatic.   Eyes: Conjunctivae are normal.   Neurological: He is alert and oriented to person, place, and time.   Psychiatric: He has a normal mood and affect.         Right Elbow Exam     Range of Motion   The patient has normal right elbow ROM.      Left Elbow Exam     Inspection   Scars: present  Bruising: present    Range of Motion   Extension: abnormal   Flexion: abnormal   Pronation: abnormal   Supination:  abnormal     Other   Sensation: normal             Assessment:     Good wound closure. No infection noted.    Encounter Diagnosis   Name Primary?    Open dislocation of left elbow, subsequent encounter Yes       Plan:     We will remove sutures today and apply steri strips to wound site. He will remain immobilized in his splint until his next visit in 1 week.At that time we will recheck his wound. If healing continues we will consider starting movement of the left elbow.

## 2019-08-08 VITALS
OXYGEN SATURATION: 97 % | HEART RATE: 61 BPM | RESPIRATION RATE: 15 BRPM | BODY MASS INDEX: 28.46 KG/M2 | TEMPERATURE: 98 F | HEIGHT: 72 IN | DIASTOLIC BLOOD PRESSURE: 58 MMHG | SYSTOLIC BLOOD PRESSURE: 140 MMHG | WEIGHT: 210.13 LBS

## 2019-08-14 ENCOUNTER — OFFICE VISIT (OUTPATIENT)
Dept: ORTHOPEDICS | Facility: CLINIC | Age: 69
End: 2019-08-14
Payer: COMMERCIAL

## 2019-08-14 ENCOUNTER — HOSPITAL ENCOUNTER (OUTPATIENT)
Dept: RADIOLOGY | Facility: HOSPITAL | Age: 69
Discharge: HOME | End: 2019-08-14
Attending: ORTHOPAEDIC SURGERY
Payer: COMMERCIAL

## 2019-08-14 VITALS
TEMPERATURE: 98 F | HEART RATE: 77 BPM | HEIGHT: 71 IN | DIASTOLIC BLOOD PRESSURE: 64 MMHG | WEIGHT: 230.19 LBS | BODY MASS INDEX: 32.23 KG/M2 | SYSTOLIC BLOOD PRESSURE: 111 MMHG

## 2019-08-14 DIAGNOSIS — M25.522 LEFT ELBOW PAIN: Primary | ICD-10-CM

## 2019-08-14 DIAGNOSIS — S53.105D OPEN DISLOCATION OF LEFT ELBOW, SUBSEQUENT ENCOUNTER: Primary | ICD-10-CM

## 2019-08-14 DIAGNOSIS — M25.522 LEFT ELBOW PAIN: ICD-10-CM

## 2019-08-14 DIAGNOSIS — S51.002D OPEN DISLOCATION OF LEFT ELBOW, SUBSEQUENT ENCOUNTER: Primary | ICD-10-CM

## 2019-08-14 PROCEDURE — 99999 PR PBB SHADOW E&M-EST. PATIENT-LVL V: CPT | Mod: PBBFAC,,, | Performed by: ORTHOPAEDIC SURGERY

## 2019-08-14 PROCEDURE — 99999 PR PBB SHADOW E&M-EST. PATIENT-LVL V: ICD-10-PCS | Mod: PBBFAC,,, | Performed by: ORTHOPAEDIC SURGERY

## 2019-08-14 PROCEDURE — 73080 X-RAY EXAM OF ELBOW: CPT | Mod: TC,LT

## 2019-08-14 PROCEDURE — 99024 PR POST-OP FOLLOW-UP VISIT: ICD-10-PCS | Mod: S$GLB,,, | Performed by: ORTHOPAEDIC SURGERY

## 2019-08-14 PROCEDURE — 99024 POSTOP FOLLOW-UP VISIT: CPT | Mod: S$GLB,,, | Performed by: ORTHOPAEDIC SURGERY

## 2019-08-14 PROCEDURE — 73080 XR ELBOW COMPLETE 3 VIEW LEFT: ICD-10-PCS | Mod: 26,LT,, | Performed by: RADIOLOGY

## 2019-08-14 PROCEDURE — 73080 X-RAY EXAM OF ELBOW: CPT | Mod: 26,LT,, | Performed by: RADIOLOGY

## 2019-08-14 NOTE — PROGRESS NOTES
Subjective:      Patient ID: Memo Lew is a 69 y.o. male.    Chief Complaint: Post-op Evaluation and Pain of the Left Elbow      Memo Lew returns to the clinic today for his 2nd post-operative examination of history of open posterior elbow dislocation with reduction and irrigation debridement. Memo Lew rates pain at a 3/10 on visual analog scale.  He is currently using his posterior splint.  He denies fever, chest pain, shortness of breath.          Objective:    Ortho/SPM Exam   Examination of the left elbow:  Patient's previous wound is healing appropriately.  There is no overt sign of infection.  He has moderate restriction motion of the elbow.  There is no gross stability. At the wrist and hand his motor, sensory, vascular is within normal limits.  Diagnostic studies:  Two views of the elbow were obtained reviewed.  There is no overt fracture in the elbow is located.        Assessment:  Doing well       Encounter Diagnosis   Name Primary?    Open dislocation of left elbow, subsequent encounter Yes         Plan:  Patient be referred to occupational therapy for range-of-motion exercises and application of posterior splint.       Memo was seen today for post-op evaluation and pain.    Diagnoses and all orders for this visit:    Open dislocation of left elbow, subsequent encounter  -     Ambulatory Referral to Physical/Occupational Therapy          Follow-up: Memo Lew to follow up in 6 weeks. If there are any questions prior to this, the patient was instructed to contact the office.

## 2019-08-16 ENCOUNTER — TELEPHONE (OUTPATIENT)
Dept: ORTHOPEDICS | Facility: CLINIC | Age: 69
End: 2019-08-16

## 2019-08-16 DIAGNOSIS — S53.105D OPEN DISLOCATION OF LEFT ELBOW, SUBSEQUENT ENCOUNTER: Primary | ICD-10-CM

## 2019-08-16 DIAGNOSIS — S51.002D OPEN DISLOCATION OF LEFT ELBOW, SUBSEQUENT ENCOUNTER: Primary | ICD-10-CM

## 2019-08-16 NOTE — PROGRESS NOTES
I talked to Mr. Lew about not receiving his splint in a timely manor. I told him that I would follow-up with therapy about getting him a splint and to get him in today.     Per Dr. Clark we are changing the splint to a ROM elbow splint, instead of a hard plastic removable splint     We are referring him over to the [facility] PT department for getting his ROM splint. Explaining that he is unhappy and trying to accommodate him today if possible.  I spoke with Tiffani at our clinic to contact the [facility] about working him in

## 2019-09-12 ENCOUNTER — TELEPHONE (OUTPATIENT)
Dept: ORTHOPEDICS | Facility: CLINIC | Age: 69
End: 2019-09-12

## 2019-09-12 NOTE — TELEPHONE ENCOUNTER
Returned a call to Mr Lew. He had question about how long to wear his brace. I told him at least until his next appointment in 2 weeks.  -He also was unsure of his surgeon. I told him Dr. Clark was the surgeon  -I follow-up with his OT Jacy Patrick at [facility] and explained to her Mr Lew's concerns. She along with I will inform Mr Lew about his treatment plan.

## 2019-09-25 ENCOUNTER — HOSPITAL ENCOUNTER (OUTPATIENT)
Dept: RADIOLOGY | Facility: HOSPITAL | Age: 69
Discharge: HOME | End: 2019-09-25
Attending: ORTHOPAEDIC SURGERY
Payer: COMMERCIAL

## 2019-09-25 ENCOUNTER — OFFICE VISIT (OUTPATIENT)
Dept: ORTHOPEDICS | Facility: CLINIC | Age: 69
End: 2019-09-25
Payer: COMMERCIAL

## 2019-09-25 VITALS
HEART RATE: 78 BPM | HEIGHT: 71 IN | SYSTOLIC BLOOD PRESSURE: 150 MMHG | WEIGHT: 230 LBS | BODY MASS INDEX: 32.2 KG/M2 | DIASTOLIC BLOOD PRESSURE: 83 MMHG

## 2019-09-25 DIAGNOSIS — S53.105D OPEN DISLOCATION OF LEFT ELBOW, SUBSEQUENT ENCOUNTER: Primary | ICD-10-CM

## 2019-09-25 DIAGNOSIS — S51.002D OPEN DISLOCATION OF LEFT ELBOW, SUBSEQUENT ENCOUNTER: Primary | ICD-10-CM

## 2019-09-25 DIAGNOSIS — M25.522 LEFT ELBOW PAIN: ICD-10-CM

## 2019-09-25 PROCEDURE — 99999 PR PBB SHADOW E&M-EST. PATIENT-LVL IV: ICD-10-PCS | Mod: PBBFAC,,, | Performed by: ORTHOPAEDIC SURGERY

## 2019-09-25 PROCEDURE — 73080 XR ELBOW COMPLETE 3 VIEW LEFT: ICD-10-PCS | Mod: 26,LT,, | Performed by: RADIOLOGY

## 2019-09-25 PROCEDURE — 73080 X-RAY EXAM OF ELBOW: CPT | Mod: 26,LT,, | Performed by: RADIOLOGY

## 2019-09-25 PROCEDURE — 99999 PR PBB SHADOW E&M-EST. PATIENT-LVL IV: CPT | Mod: PBBFAC,,, | Performed by: ORTHOPAEDIC SURGERY

## 2019-09-25 PROCEDURE — 73080 X-RAY EXAM OF ELBOW: CPT | Mod: TC,LT

## 2019-09-25 NOTE — PROGRESS NOTES
Patient's pain is controlled he has near full range of motion he only complains of some paresthesias is wound is benign there is no sign of recurrent infection or complication he may discontinue the use of the brace at this time and follow up with us on an as-needed basis.  The patient is under no restrictions at this time approximately 8 weeks status post is open dislocation with ulnar nerve decompression.

## 2023-07-30 ENCOUNTER — HOSPITAL ENCOUNTER (OUTPATIENT)
Facility: HOSPITAL | Age: 73
Discharge: HOME | DRG: 418 | End: 2023-08-02
Attending: EMERGENCY MEDICINE | Admitting: INTERNAL MEDICINE
Payer: COMMERCIAL

## 2023-07-30 DIAGNOSIS — R79.89 ELEVATED LFTS: ICD-10-CM

## 2023-07-30 DIAGNOSIS — K81.0 ACUTE CHOLECYSTITIS: Primary | ICD-10-CM

## 2023-07-30 DIAGNOSIS — I48.91 ATRIAL FIBRILLATION: ICD-10-CM

## 2023-07-30 DIAGNOSIS — I25.10 CORONARY ARTERY DISEASE: ICD-10-CM

## 2023-07-30 DIAGNOSIS — R07.9 CHEST PAIN: ICD-10-CM

## 2023-07-30 LAB
ALBUMIN SERPL BCP-MCNC: 3.2 G/DL (ref 3.5–5.2)
ALP SERPL-CCNC: 288 U/L (ref 55–135)
ALT SERPL W/O P-5'-P-CCNC: 71 U/L (ref 10–44)
ANION GAP SERPL CALC-SCNC: 10 MMOL/L (ref 8–16)
AST SERPL-CCNC: 89 U/L (ref 10–40)
BASOPHILS # BLD AUTO: 0.06 K/UL (ref 0–0.2)
BASOPHILS NFR BLD: 0.6 % (ref 0–1.9)
BILIRUB SERPL-MCNC: 0.9 MG/DL (ref 0.1–1)
BUN SERPL-MCNC: 17 MG/DL (ref 8–23)
CALCIUM SERPL-MCNC: 9.7 MG/DL (ref 8.7–10.5)
CHLORIDE SERPL-SCNC: 104 MMOL/L (ref 95–110)
CO2 SERPL-SCNC: 27 MMOL/L (ref 23–29)
CREAT SERPL-MCNC: 1.1 MG/DL (ref 0.5–1.4)
DIFFERENTIAL METHOD: ABNORMAL
EOSINOPHIL # BLD AUTO: 0.1 K/UL (ref 0–0.5)
EOSINOPHIL NFR BLD: 0.8 % (ref 0–8)
ERYTHROCYTE [DISTWIDTH] IN BLOOD BY AUTOMATED COUNT: 13.3 % (ref 11.5–14.5)
EST. GFR  (NO RACE VARIABLE): >60 ML/MIN/1.73 M^2
GLUCOSE SERPL-MCNC: 98 MG/DL (ref 70–110)
HCT VFR BLD AUTO: 36.6 % (ref 40–54)
HCV AB SERPL QL IA: NEGATIVE
HEP C VIRUS HOLD SPECIMEN: NORMAL
HGB BLD-MCNC: 11.6 G/DL (ref 14–18)
HIV 1+2 AB+HIV1 P24 AG SERPL QL IA: NEGATIVE
IMM GRANULOCYTES # BLD AUTO: 0.05 K/UL (ref 0–0.04)
IMM GRANULOCYTES NFR BLD AUTO: 0.5 % (ref 0–0.5)
LACTATE SERPL-SCNC: 0.7 MMOL/L (ref 0.5–2.2)
LIPASE SERPL-CCNC: 14 U/L (ref 4–60)
LYMPHOCYTES # BLD AUTO: 1.3 K/UL (ref 1–4.8)
LYMPHOCYTES NFR BLD: 13.4 % (ref 18–48)
MCH RBC QN AUTO: 29.4 PG (ref 27–31)
MCHC RBC AUTO-ENTMCNC: 31.7 G/DL (ref 32–36)
MCV RBC AUTO: 93 FL (ref 82–98)
MONOCYTES # BLD AUTO: 0.9 K/UL (ref 0.3–1)
MONOCYTES NFR BLD: 9.6 % (ref 4–15)
NEUTROPHILS # BLD AUTO: 7.4 K/UL (ref 1.8–7.7)
NEUTROPHILS NFR BLD: 75.1 % (ref 38–73)
NRBC BLD-RTO: 0 /100 WBC
PLATELET # BLD AUTO: 257 K/UL (ref 150–450)
PMV BLD AUTO: 10.9 FL (ref 9.2–12.9)
POTASSIUM SERPL-SCNC: 4.3 MMOL/L (ref 3.5–5.1)
PROT SERPL-MCNC: 6.6 G/DL (ref 6–8.4)
RBC # BLD AUTO: 3.95 M/UL (ref 4.6–6.2)
SODIUM SERPL-SCNC: 141 MMOL/L (ref 136–145)
TROPONIN I SERPL DL<=0.01 NG/ML-MCNC: <0.006 NG/ML (ref 0–0.03)
WBC # BLD AUTO: 9.81 K/UL (ref 3.9–12.7)

## 2023-07-30 PROCEDURE — 84484 ASSAY OF TROPONIN QUANT: CPT | Performed by: EMERGENCY MEDICINE

## 2023-07-30 PROCEDURE — 87389 HIV-1 AG W/HIV-1&-2 AB AG IA: CPT | Performed by: EMERGENCY MEDICINE

## 2023-07-30 PROCEDURE — 96375 TX/PRO/DX INJ NEW DRUG ADDON: CPT

## 2023-07-30 PROCEDURE — 83605 ASSAY OF LACTIC ACID: CPT | Performed by: EMERGENCY MEDICINE

## 2023-07-30 PROCEDURE — 93010 EKG 12-LEAD: ICD-10-PCS | Mod: ,,, | Performed by: INTERNAL MEDICINE

## 2023-07-30 PROCEDURE — 80053 COMPREHEN METABOLIC PANEL: CPT | Performed by: EMERGENCY MEDICINE

## 2023-07-30 PROCEDURE — 63600175 PHARM REV CODE 636 W HCPCS: Performed by: EMERGENCY MEDICINE

## 2023-07-30 PROCEDURE — 25000003 PHARM REV CODE 250: Performed by: EMERGENCY MEDICINE

## 2023-07-30 PROCEDURE — 99285 EMERGENCY DEPT VISIT HI MDM: CPT | Mod: 25

## 2023-07-30 PROCEDURE — 96361 HYDRATE IV INFUSION ADD-ON: CPT

## 2023-07-30 PROCEDURE — 93010 ELECTROCARDIOGRAM REPORT: CPT | Mod: ,,, | Performed by: INTERNAL MEDICINE

## 2023-07-30 PROCEDURE — 96376 TX/PRO/DX INJ SAME DRUG ADON: CPT

## 2023-07-30 PROCEDURE — 85025 COMPLETE CBC W/AUTO DIFF WBC: CPT | Performed by: EMERGENCY MEDICINE

## 2023-07-30 PROCEDURE — 86803 HEPATITIS C AB TEST: CPT | Performed by: EMERGENCY MEDICINE

## 2023-07-30 PROCEDURE — 93005 ELECTROCARDIOGRAM TRACING: CPT

## 2023-07-30 PROCEDURE — 83690 ASSAY OF LIPASE: CPT | Performed by: EMERGENCY MEDICINE

## 2023-07-30 RX ORDER — ALLOPURINOL 300 MG/1
300 TABLET ORAL EVERY MORNING
COMMUNITY
Start: 2023-07-24

## 2023-07-30 RX ORDER — MESALAMINE 1.2 G/1
1.2 TABLET, DELAYED RELEASE ORAL
COMMUNITY

## 2023-07-30 RX ORDER — MORPHINE SULFATE 4 MG/ML
4 INJECTION, SOLUTION INTRAMUSCULAR; INTRAVENOUS EVERY 4 HOURS PRN
Status: DISCONTINUED | OUTPATIENT
Start: 2023-07-31 | End: 2023-08-02 | Stop reason: HOSPADM

## 2023-07-30 RX ORDER — COLCHICINE 0.6 MG/1
0.6 TABLET ORAL DAILY
COMMUNITY

## 2023-07-30 RX ORDER — ONDANSETRON 2 MG/ML
4 INJECTION INTRAMUSCULAR; INTRAVENOUS
Status: COMPLETED | OUTPATIENT
Start: 2023-07-30 | End: 2023-07-30

## 2023-07-30 RX ORDER — AMLODIPINE BESYLATE 2.5 MG/1
2.5 TABLET ORAL
COMMUNITY
Start: 2023-07-03

## 2023-07-30 RX ORDER — BENAZEPRIL HYDROCHLORIDE 20 MG/1
20 TABLET ORAL 2 TIMES DAILY
COMMUNITY
Start: 2023-07-03

## 2023-07-30 RX ORDER — ONDANSETRON 2 MG/ML
4 INJECTION INTRAMUSCULAR; INTRAVENOUS EVERY 6 HOURS PRN
Status: DISCONTINUED | OUTPATIENT
Start: 2023-07-31 | End: 2023-08-02 | Stop reason: HOSPADM

## 2023-07-30 RX ORDER — MORPHINE SULFATE 4 MG/ML
2 INJECTION, SOLUTION INTRAMUSCULAR; INTRAVENOUS
Status: COMPLETED | OUTPATIENT
Start: 2023-07-30 | End: 2023-07-30

## 2023-07-30 RX ADMIN — MORPHINE SULFATE 2 MG: 4 INJECTION INTRAVENOUS at 10:07

## 2023-07-30 RX ADMIN — SODIUM CHLORIDE 1000 ML: 9 INJECTION, SOLUTION INTRAVENOUS at 10:07

## 2023-07-30 RX ADMIN — ONDANSETRON 4 MG: 2 INJECTION INTRAMUSCULAR; INTRAVENOUS at 10:07

## 2023-07-31 PROBLEM — D86.0 SARCOIDOSIS OF LUNG: Status: ACTIVE | Noted: 2023-07-31

## 2023-07-31 PROBLEM — M10.9 GOUT: Status: ACTIVE | Noted: 2023-07-31

## 2023-07-31 PROBLEM — I73.9 PAD (PERIPHERAL ARTERY DISEASE): Status: ACTIVE | Noted: 2023-07-31

## 2023-07-31 PROBLEM — I77.9 CAROTID ARTERY DISEASE: Status: ACTIVE | Noted: 2023-07-31

## 2023-07-31 PROBLEM — Z95.0 PACEMAKER: Status: ACTIVE | Noted: 2023-07-31

## 2023-07-31 PROBLEM — K50.90 CROHN DISEASE: Status: ACTIVE | Noted: 2023-07-31

## 2023-07-31 PROBLEM — K81.0 ACUTE CHOLECYSTITIS: Status: ACTIVE | Noted: 2023-07-31

## 2023-07-31 PROBLEM — R79.89 ELEVATED LFTS: Status: ACTIVE | Noted: 2023-07-31

## 2023-07-31 LAB
ALBUMIN SERPL BCP-MCNC: 2.9 G/DL (ref 3.5–5.2)
ALP SERPL-CCNC: 237 U/L (ref 55–135)
ALT SERPL W/O P-5'-P-CCNC: 51 U/L (ref 10–44)
ANION GAP SERPL CALC-SCNC: 7 MMOL/L (ref 8–16)
AORTIC ROOT ANNULUS: 3.36 CM
ASCENDING AORTA: 3.23 CM
AST SERPL-CCNC: 41 U/L (ref 10–40)
AV INDEX (PROSTH): 0.54
AV MEAN GRADIENT: 6 MMHG
AV PEAK GRADIENT: 10 MMHG
AV VALVE AREA BY VELOCITY RATIO: 1.43 CM²
AV VALVE AREA: 1.77 CM²
AV VELOCITY RATIO: 0.44
BASOPHILS # BLD AUTO: 0.05 K/UL (ref 0–0.2)
BASOPHILS NFR BLD: 0.7 % (ref 0–1.9)
BILIRUB SERPL-MCNC: 0.9 MG/DL (ref 0.1–1)
BILIRUB UR QL STRIP: NEGATIVE
BSA FOR ECHO PROCEDURE: 2.19 M2
BUN SERPL-MCNC: 13 MG/DL (ref 8–23)
CALCIUM SERPL-MCNC: 8.9 MG/DL (ref 8.7–10.5)
CHLORIDE SERPL-SCNC: 105 MMOL/L (ref 95–110)
CLARITY UR: CLEAR
CO2 SERPL-SCNC: 28 MMOL/L (ref 23–29)
COLOR UR: YELLOW
CREAT SERPL-MCNC: 1 MG/DL (ref 0.5–1.4)
CV ECHO LV RWT: 0.41 CM
DIFFERENTIAL METHOD: ABNORMAL
DOP CALC AO PEAK VEL: 1.6 M/S
DOP CALC AO VTI: 30.3 CM
DOP CALC LVOT AREA: 3.3 CM2
DOP CALC LVOT DIAMETER: 2.04 CM
DOP CALC LVOT PEAK VEL: 0.7 M/S
DOP CALC LVOT STROKE VOLUME: 53.58 CM3
DOP CALC RVOT PEAK VEL: 0.84 M/S
DOP CALC RVOT VTI: 17.6 CM
DOP CALCLVOT PEAK VEL VTI: 16.4 CM
E/E' RATIO: 8.89 M/S
ECHO LV POSTERIOR WALL: 1.05 CM (ref 0.6–1.1)
EOSINOPHIL # BLD AUTO: 0.1 K/UL (ref 0–0.5)
EOSINOPHIL NFR BLD: 1.3 % (ref 0–8)
ERYTHROCYTE [DISTWIDTH] IN BLOOD BY AUTOMATED COUNT: 13.5 % (ref 11.5–14.5)
EST. GFR  (NO RACE VARIABLE): >60 ML/MIN/1.73 M^2
FRACTIONAL SHORTENING: 38 % (ref 28–44)
GLUCOSE SERPL-MCNC: 96 MG/DL (ref 70–110)
GLUCOSE UR QL STRIP: NEGATIVE
HCT VFR BLD AUTO: 34.1 % (ref 40–54)
HGB BLD-MCNC: 10.5 G/DL (ref 14–18)
HGB UR QL STRIP: NEGATIVE
IMM GRANULOCYTES # BLD AUTO: 0.02 K/UL (ref 0–0.04)
IMM GRANULOCYTES NFR BLD AUTO: 0.3 % (ref 0–0.5)
INTERVENTRICULAR SEPTUM: 1.22 CM (ref 0.6–1.1)
IVC DIAMETER: 2.31 CM
IVRT: 64.7 MSEC
KETONES UR QL STRIP: NEGATIVE
LA MAJOR: 6.29 CM
LA MINOR: 6.32 CM
LA WIDTH: 4.6 CM
LEFT ATRIUM SIZE: 4.53 CM
LEFT ATRIUM VOLUME INDEX: 51.7 ML/M2
LEFT ATRIUM VOLUME: 111.68 CM3
LEFT INTERNAL DIMENSION IN SYSTOLE: 3.15 CM (ref 2.1–4)
LEFT VENTRICLE DIASTOLIC VOLUME INDEX: 57.77 ML/M2
LEFT VENTRICLE DIASTOLIC VOLUME: 124.78 ML
LEFT VENTRICLE MASS INDEX: 104 G/M2
LEFT VENTRICLE SYSTOLIC VOLUME INDEX: 18.3 ML/M2
LEFT VENTRICLE SYSTOLIC VOLUME: 39.5 ML
LEFT VENTRICULAR INTERNAL DIMENSION IN DIASTOLE: 5.12 CM (ref 3.5–6)
LEFT VENTRICULAR MASS: 224.71 G
LEUKOCYTE ESTERASE UR QL STRIP: ABNORMAL
LV LATERAL E/E' RATIO: 7.5 M/S
LV SEPTAL E/E' RATIO: 10.91 M/S
LVOT MG: 1.39 MMHG
LVOT MV: 0.58 CM/S
LYMPHOCYTES # BLD AUTO: 1.3 K/UL (ref 1–4.8)
LYMPHOCYTES NFR BLD: 17.3 % (ref 18–48)
MAGNESIUM SERPL-MCNC: 1.9 MG/DL (ref 1.6–2.6)
MCH RBC QN AUTO: 29.2 PG (ref 27–31)
MCHC RBC AUTO-ENTMCNC: 30.8 G/DL (ref 32–36)
MCV RBC AUTO: 95 FL (ref 82–98)
MICROSCOPIC COMMENT: NORMAL
MONOCYTES # BLD AUTO: 0.8 K/UL (ref 0.3–1)
MONOCYTES NFR BLD: 10.2 % (ref 4–15)
MV PEAK E VEL: 1.2 M/S
MV STENOSIS PRESSURE HALF TIME: 62.18 MS
MV VALVE AREA P 1/2 METHOD: 3.54 CM2
NEUTROPHILS # BLD AUTO: 5.3 K/UL (ref 1.8–7.7)
NEUTROPHILS NFR BLD: 70.2 % (ref 38–73)
NITRITE UR QL STRIP: NEGATIVE
NRBC BLD-RTO: 0 /100 WBC
PH UR STRIP: 6 [PH] (ref 5–8)
PHOSPHATE SERPL-MCNC: 3 MG/DL (ref 2.7–4.5)
PISA MRMAX VEL: 3.32 M/S
PISA TR MAX VEL: 2.88 M/S
PLATELET # BLD AUTO: 220 K/UL (ref 150–450)
PMV BLD AUTO: 10.7 FL (ref 9.2–12.9)
POTASSIUM SERPL-SCNC: 3.9 MMOL/L (ref 3.5–5.1)
PROT SERPL-MCNC: 5.9 G/DL (ref 6–8.4)
PROT UR QL STRIP: NEGATIVE
PV MEAN GRADIENT: 2 MMHG
RA MAJOR: 5.64 CM
RA PRESSURE ESTIMATED: 8 MMHG
RA WIDTH: 4 CM
RBC # BLD AUTO: 3.6 M/UL (ref 4.6–6.2)
RV TB RVSP: 11 MMHG
SODIUM SERPL-SCNC: 140 MMOL/L (ref 136–145)
SP GR UR STRIP: 1.02 (ref 1–1.03)
STJ: 3.43 CM
TAPSE: 1.3 CM
TDI LATERAL: 0.16 M/S
TDI SEPTAL: 0.11 M/S
TDI: 0.14 M/S
TR MAX PG: 33 MMHG
TR MEAN GRADIENT: 30 MMHG
URN SPEC COLLECT METH UR: ABNORMAL
UROBILINOGEN UR STRIP-ACNC: NEGATIVE EU/DL
WBC # BLD AUTO: 7.61 K/UL (ref 3.9–12.7)
WBC #/AREA URNS HPF: 5 /HPF (ref 0–5)
Z-SCORE OF LEFT VENTRICULAR DIMENSION IN END DIASTOLE: -3.24
Z-SCORE OF LEFT VENTRICULAR DIMENSION IN END SYSTOLE: -2.48

## 2023-07-31 PROCEDURE — 96361 HYDRATE IV INFUSION ADD-ON: CPT

## 2023-07-31 PROCEDURE — 81000 URINALYSIS NONAUTO W/SCOPE: CPT | Performed by: EMERGENCY MEDICINE

## 2023-07-31 PROCEDURE — 25000003 PHARM REV CODE 250: Performed by: EMERGENCY MEDICINE

## 2023-07-31 PROCEDURE — 99223 PR INITIAL HOSPITAL CARE,LEVL III: ICD-10-PCS | Mod: ,,, | Performed by: SURGERY

## 2023-07-31 PROCEDURE — 96366 THER/PROPH/DIAG IV INF ADDON: CPT

## 2023-07-31 PROCEDURE — 36415 COLL VENOUS BLD VENIPUNCTURE: CPT | Performed by: NURSE PRACTITIONER

## 2023-07-31 PROCEDURE — 96376 TX/PRO/DX INJ SAME DRUG ADON: CPT

## 2023-07-31 PROCEDURE — 99223 1ST HOSP IP/OBS HIGH 75: CPT | Mod: ,,, | Performed by: SURGERY

## 2023-07-31 PROCEDURE — 83735 ASSAY OF MAGNESIUM: CPT | Performed by: NURSE PRACTITIONER

## 2023-07-31 PROCEDURE — 99285 EMERGENCY DEPT VISIT HI MDM: CPT | Mod: 25

## 2023-07-31 PROCEDURE — G0378 HOSPITAL OBSERVATION PER HR: HCPCS

## 2023-07-31 PROCEDURE — 25000003 PHARM REV CODE 250: Performed by: NURSE PRACTITIONER

## 2023-07-31 PROCEDURE — 63600175 PHARM REV CODE 636 W HCPCS: Performed by: NURSE PRACTITIONER

## 2023-07-31 PROCEDURE — 25000003 PHARM REV CODE 250: Performed by: INTERNAL MEDICINE

## 2023-07-31 PROCEDURE — 63600175 PHARM REV CODE 636 W HCPCS: Performed by: EMERGENCY MEDICINE

## 2023-07-31 PROCEDURE — 85025 COMPLETE CBC W/AUTO DIFF WBC: CPT | Performed by: NURSE PRACTITIONER

## 2023-07-31 PROCEDURE — 96365 THER/PROPH/DIAG IV INF INIT: CPT

## 2023-07-31 PROCEDURE — 80053 COMPREHEN METABOLIC PANEL: CPT | Performed by: NURSE PRACTITIONER

## 2023-07-31 PROCEDURE — 84100 ASSAY OF PHOSPHORUS: CPT | Performed by: NURSE PRACTITIONER

## 2023-07-31 RX ORDER — PROCHLORPERAZINE EDISYLATE 5 MG/ML
5 INJECTION INTRAMUSCULAR; INTRAVENOUS EVERY 6 HOURS PRN
Status: DISCONTINUED | OUTPATIENT
Start: 2023-07-31 | End: 2023-08-02 | Stop reason: HOSPADM

## 2023-07-31 RX ORDER — SODIUM,POTASSIUM PHOSPHATES 280-250MG
2 POWDER IN PACKET (EA) ORAL
Status: DISCONTINUED | OUTPATIENT
Start: 2023-07-31 | End: 2023-07-31

## 2023-07-31 RX ORDER — IPRATROPIUM BROMIDE AND ALBUTEROL SULFATE 2.5; .5 MG/3ML; MG/3ML
3 SOLUTION RESPIRATORY (INHALATION) EVERY 6 HOURS PRN
Status: DISCONTINUED | OUTPATIENT
Start: 2023-07-31 | End: 2023-08-02 | Stop reason: HOSPADM

## 2023-07-31 RX ORDER — GABAPENTIN 400 MG/1
800 CAPSULE ORAL 3 TIMES DAILY
Status: DISCONTINUED | OUTPATIENT
Start: 2023-07-31 | End: 2023-08-02 | Stop reason: HOSPADM

## 2023-07-31 RX ORDER — ASPIRIN 81 MG/1
81 TABLET ORAL DAILY
Status: DISCONTINUED | OUTPATIENT
Start: 2023-07-31 | End: 2023-08-02 | Stop reason: HOSPADM

## 2023-07-31 RX ORDER — CARVEDILOL 12.5 MG/1
25 TABLET ORAL 2 TIMES DAILY WITH MEALS
Status: DISCONTINUED | OUTPATIENT
Start: 2023-07-31 | End: 2023-08-02 | Stop reason: HOSPADM

## 2023-07-31 RX ORDER — FOLIC ACID 1 MG/1
1 TABLET ORAL DAILY
Status: DISCONTINUED | OUTPATIENT
Start: 2023-07-31 | End: 2023-08-02 | Stop reason: HOSPADM

## 2023-07-31 RX ORDER — LANOLIN ALCOHOL/MO/W.PET/CERES
800 CREAM (GRAM) TOPICAL
Status: DISCONTINUED | OUTPATIENT
Start: 2023-07-31 | End: 2023-07-31

## 2023-07-31 RX ORDER — TALC
6 POWDER (GRAM) TOPICAL NIGHTLY PRN
Status: DISCONTINUED | OUTPATIENT
Start: 2023-07-31 | End: 2023-08-02 | Stop reason: HOSPADM

## 2023-07-31 RX ORDER — DIPHENHYDRAMINE HCL 25 MG
25 CAPSULE ORAL EVERY 6 HOURS PRN
Status: DISCONTINUED | OUTPATIENT
Start: 2023-07-31 | End: 2023-08-02 | Stop reason: HOSPADM

## 2023-07-31 RX ORDER — PANTOPRAZOLE SODIUM 40 MG/1
40 TABLET, DELAYED RELEASE ORAL DAILY
Status: DISCONTINUED | OUTPATIENT
Start: 2023-07-31 | End: 2023-08-02 | Stop reason: HOSPADM

## 2023-07-31 RX ORDER — NALOXONE HCL 0.4 MG/ML
0.02 VIAL (ML) INJECTION
Status: DISCONTINUED | OUTPATIENT
Start: 2023-07-31 | End: 2023-08-02 | Stop reason: HOSPADM

## 2023-07-31 RX ORDER — IBUPROFEN 200 MG
16 TABLET ORAL
Status: DISCONTINUED | OUTPATIENT
Start: 2023-07-31 | End: 2023-08-02 | Stop reason: HOSPADM

## 2023-07-31 RX ORDER — SODIUM CHLORIDE 0.9 % (FLUSH) 0.9 %
10 SYRINGE (ML) INJECTION EVERY 8 HOURS PRN
Status: DISCONTINUED | OUTPATIENT
Start: 2023-07-31 | End: 2023-08-02 | Stop reason: HOSPADM

## 2023-07-31 RX ORDER — SODIUM CHLORIDE, SODIUM LACTATE, POTASSIUM CHLORIDE, CALCIUM CHLORIDE 600; 310; 30; 20 MG/100ML; MG/100ML; MG/100ML; MG/100ML
INJECTION, SOLUTION INTRAVENOUS CONTINUOUS
Status: DISCONTINUED | OUTPATIENT
Start: 2023-07-31 | End: 2023-08-01

## 2023-07-31 RX ORDER — IBUPROFEN 200 MG
24 TABLET ORAL
Status: DISCONTINUED | OUTPATIENT
Start: 2023-07-31 | End: 2023-08-02 | Stop reason: HOSPADM

## 2023-07-31 RX ORDER — ACETAMINOPHEN 325 MG/1
650 TABLET ORAL EVERY 4 HOURS PRN
Status: DISCONTINUED | OUTPATIENT
Start: 2023-07-31 | End: 2023-08-02 | Stop reason: HOSPADM

## 2023-07-31 RX ORDER — AMIODARONE HYDROCHLORIDE 200 MG/1
200 TABLET ORAL DAILY
Status: DISCONTINUED | OUTPATIENT
Start: 2023-07-31 | End: 2023-08-02 | Stop reason: HOSPADM

## 2023-07-31 RX ORDER — PRAVASTATIN SODIUM 20 MG/1
80 TABLET ORAL DAILY
Status: DISCONTINUED | OUTPATIENT
Start: 2023-07-31 | End: 2023-08-02

## 2023-07-31 RX ORDER — ONDANSETRON 2 MG/ML
4 INJECTION INTRAMUSCULAR; INTRAVENOUS EVERY 6 HOURS PRN
Status: DISCONTINUED | OUTPATIENT
Start: 2023-07-31 | End: 2023-07-31

## 2023-07-31 RX ORDER — SODIUM CHLORIDE 9 MG/ML
1000 INJECTION, SOLUTION INTRAVENOUS
Status: DISCONTINUED | OUTPATIENT
Start: 2023-07-31 | End: 2023-07-31

## 2023-07-31 RX ORDER — ALLOPURINOL 300 MG/1
300 TABLET ORAL EVERY MORNING
Status: DISCONTINUED | OUTPATIENT
Start: 2023-07-31 | End: 2023-08-02 | Stop reason: HOSPADM

## 2023-07-31 RX ORDER — GLUCAGON 1 MG
1 KIT INJECTION
Status: DISCONTINUED | OUTPATIENT
Start: 2023-07-31 | End: 2023-08-02 | Stop reason: HOSPADM

## 2023-07-31 RX ADMIN — PIPERACILLIN SODIUM AND TAZOBACTAM SODIUM 4.5 G: 4; .5 INJECTION, POWDER, FOR SOLUTION INTRAVENOUS at 10:07

## 2023-07-31 RX ADMIN — SODIUM CHLORIDE, POTASSIUM CHLORIDE, SODIUM LACTATE AND CALCIUM CHLORIDE: 600; 310; 30; 20 INJECTION, SOLUTION INTRAVENOUS at 10:07

## 2023-07-31 RX ADMIN — MORPHINE SULFATE 4 MG: 4 INJECTION INTRAVENOUS at 08:07

## 2023-07-31 RX ADMIN — CARVEDILOL 25 MG: 12.5 TABLET, FILM COATED ORAL at 05:07

## 2023-07-31 RX ADMIN — MORPHINE SULFATE 4 MG: 4 INJECTION INTRAVENOUS at 10:07

## 2023-07-31 RX ADMIN — SODIUM CHLORIDE, POTASSIUM CHLORIDE, SODIUM LACTATE AND CALCIUM CHLORIDE: 600; 310; 30; 20 INJECTION, SOLUTION INTRAVENOUS at 02:07

## 2023-07-31 RX ADMIN — MORPHINE SULFATE 4 MG: 4 INJECTION INTRAVENOUS at 04:07

## 2023-07-31 RX ADMIN — MORPHINE SULFATE 4 MG: 4 INJECTION INTRAVENOUS at 01:07

## 2023-07-31 RX ADMIN — PIPERACILLIN SODIUM AND TAZOBACTAM SODIUM 4.5 G: 4; .5 INJECTION, POWDER, FOR SOLUTION INTRAVENOUS at 01:07

## 2023-07-31 RX ADMIN — PIPERACILLIN SODIUM AND TAZOBACTAM SODIUM 4.5 G: 4; .5 INJECTION, POWDER, FOR SOLUTION INTRAVENOUS at 05:07

## 2023-07-31 RX ADMIN — CARVEDILOL 25 MG: 12.5 TABLET, FILM COATED ORAL at 07:07

## 2023-07-31 RX ADMIN — ALLOPURINOL 300 MG: 300 TABLET ORAL at 07:07

## 2023-07-31 RX ADMIN — DIPHENHYDRAMINE HYDROCHLORIDE 25 MG: 25 CAPSULE ORAL at 05:07

## 2023-07-31 RX ADMIN — MORPHINE SULFATE 4 MG: 4 INJECTION INTRAVENOUS at 12:07

## 2023-07-31 NOTE — ASSESSMENT & PLAN NOTE
Likely 2/2 to above   Hep C negative   Monitor      Zhou Siu (MD)  Ophthalmology  [address], [address]  [place] [address]  Phone: [phone]  Fax: [phone]  Follow Up Time:

## 2023-07-31 NOTE — ASSESSMENT & PLAN NOTE
Patient was originally scheduled for MRCP   With patient's pacemaker will likely be unable to undergo MRCP so will tentatively plan for cholangiogram at surgery if LFTs remain elevated  Could require EUS or ERCP if unable to perform cholangiogram or findings consistent with choledocholithiasis

## 2023-07-31 NOTE — SUBJECTIVE & OBJECTIVE
Past Medical History:   Diagnosis Date    A-fib     CAD (coronary artery disease)     Carotid disease, bilateral     Crohn's disease     Gout     Hypertension     PAD (peripheral artery disease)     Sarcoidosis        Past Surgical History:   Procedure Laterality Date    CARDIAC PACEMAKER PLACEMENT      CAROTID ENDARTERECTOMY Left 2016    COLON RESECTION      due to Crohn's disease    CORONARY ARTERY BYPASS GRAFT  2018    INCISION AND DRAINAGE Left 07/27/2019    Procedure: INCISION AND DRAINAGE;  Surgeon: Jules Clark DO;  Location: [facility];  Service: Orthopedics;  Laterality: Left;    INSERTION OF PACEMAKER      SHOULDER SURGERY Right        Review of patient's allergies indicates:   Allergen Reactions    Infliximab Anaphylaxis    Iodine and iodide containing products Rash     Patient states severe rash similar to a sunburn experienced after CT contrast. Patient with prior history of pre-meds before any contrast studies.      Mercaptopurine analogues (thiopurines)        No current facility-administered medications on file prior to encounter.     Current Outpatient Medications on File Prior to Encounter   Medication Sig    allopurinoL (ZYLOPRIM) 300 MG tablet Take 300 mg by mouth every morning.    amiodarone (PACERONE) 200 MG Tab Take by mouth once daily.    amLODIPine (NORVASC) 2.5 MG tablet Take 2.5 mg by mouth.    apixaban (ELIQUIS) 5 mg Tab Take 1 tablet (5 mg total) by mouth 2 (two) times daily.    aspirin (ECOTRIN) 81 MG EC tablet Take 81 mg by mouth once daily.    benazepriL (LOTENSIN) 20 MG tablet Take 20 mg by mouth 2 (two) times daily.    carvedilol (COREG) 25 MG tablet Take 25 mg by mouth 2 (two) times daily with meals.    coenzyme Q10 100 mg Chew Take 100 mg by mouth once daily.    ezetimibe (ZETIA) 10 mg tablet Take 10 mg by mouth once daily.    fluticasone propionate (FLONASE) 50 mcg/actuation nasal spray daily as needed.    folic acid (FOLVITE) 1 MG tablet Take 1 mg by mouth once daily.     gabapentin (NEURONTIN) 800 MG tablet Take 800 mg by mouth 3 (three) times daily.    HYDROcodone-acetaminophen (NORCO) 5-325 mg per tablet Take 1 tablet by mouth every 4 (four) hours as needed for Pain.    mesalamine (LIALDA) 1.2 gram TbEC Take 1.2 g by mouth daily with breakfast.    pantoprazole (PROTONIX) 40 MG tablet Take 40 mg by mouth once daily.    pravastatin (PRAVACHOL) 80 MG tablet Take 80 mg by mouth once daily.    colchicine (COLCRYS) 0.6 mg tablet Take 0.6 mg by mouth once daily.    furosemide (LASIX) 40 MG tablet Take 40 mg by mouth 2 (two) times daily.    nozaseptin (NOZIN) nasal  1 each by Each Nostril route On call Procedure (surgery).    PENTASA 500 mg CR capsule Take by mouth 3 (three) times daily.    potassium chloride SA (K-DUR,KLOR-CON) 20 MEQ tablet Take 20 mEq by mouth 2 (two) times daily.    ramipril (ALTACE) 10 MG capsule Take 10 mg by mouth once daily.    sulfacetamide sodium-sulfur (AVAR LS) 10-2 % Foam APPLY ONE GRAM ON THE SKIN DAILY    vitamin B comp and vit C no.6 (VITAMIN B COMP WITH VIT C NO.6 ORAL) Take by mouth once daily.      Family History       Problem Relation (Age of Onset)    Breast cancer Mother    COPD Mother    Heart attack Father          Tobacco Use    Smoking status: Former     Current packs/day: 0.00    Smokeless tobacco: Never   Substance and Sexual Activity    Alcohol use: Not Currently    Drug use: Never    Sexual activity: Not on file     Review of Systems   Constitutional:  Positive for activity change. Negative for appetite change, chills, diaphoresis, fatigue and fever.   HENT:  Negative for congestion, nosebleeds, sore throat and trouble swallowing.    Eyes:  Negative for pain, discharge and visual disturbance.   Respiratory:  Negative for apnea, cough, chest tightness, shortness of breath, wheezing and stridor.    Cardiovascular:  Negative for chest pain, palpitations and leg swelling.   Gastrointestinal:  Positive for abdominal pain (RUQ). Negative  for abdominal distention, blood in stool, constipation, diarrhea, nausea and vomiting.   Endocrine: Negative for cold intolerance and heat intolerance.   Genitourinary:  Negative for difficulty urinating, dysuria, flank pain, frequency and urgency.   Musculoskeletal:  Negative for arthralgias, back pain, joint swelling, myalgias, neck pain and neck stiffness.   Skin:  Negative for rash and wound.   Allergic/Immunologic: Negative for food allergies and immunocompromised state.   Neurological:  Negative for dizziness, seizures, syncope, facial asymmetry, weakness, light-headedness and headaches.   Hematological:  Negative for adenopathy.   Psychiatric/Behavioral:  Negative for agitation, behavioral problems and confusion. The patient is not nervous/anxious.      Objective:     Vital Signs (Most Recent):  Temp: 97.9 °F (36.6 °C) (07/30/23 2049)  Pulse: 81 (07/30/23 2132)  Resp: 18 (07/31/23 0150)  BP: 121/60 (07/30/23 2132)  SpO2: 96 % (07/30/23 2132) Vital Signs (24h Range):  Temp:  [97.9 °F (36.6 °C)] 97.9 °F (36.6 °C)  Pulse:  [81-87] 81  Resp:  [18] 18  SpO2:  [96 %-98 %] 96 %  BP: (102-121)/(58-60) 121/60     Weight: 97.6 kg (215 lb 2.7 oz)  Body mass index is 29.59 kg/m².     Physical Exam  Constitutional:       General: He is not in acute distress.     Appearance: He is well-developed. He is not diaphoretic.   HENT:      Head: Normocephalic and atraumatic.      Nose: Nose normal.   Eyes:      General: No scleral icterus.     Conjunctiva/sclera: Conjunctivae normal.   Cardiovascular:      Rate and Rhythm: Normal rate and regular rhythm.      Heart sounds: Normal heart sounds. No murmur heard.     No friction rub. No gallop.      Comments: Implanted PPM Left upper chest   Pulmonary:      Effort: Pulmonary effort is normal. No respiratory distress.      Breath sounds: Normal breath sounds. No stridor. No wheezing or rales.   Chest:      Chest wall: No tenderness.   Abdominal:      General: Bowel sounds are normal.  There is no distension.      Palpations: Abdomen is soft.      Tenderness: There is abdominal tenderness (RUQ). There is no guarding or rebound.   Musculoskeletal:         General: No tenderness or deformity. Normal range of motion.      Cervical back: Normal range of motion and neck supple.   Skin:     General: Skin is warm and dry.      Coloration: Skin is not pale.      Findings: No erythema or rash.   Neurological:      Mental Status: He is alert and oriented to person, place, and time.      Cranial Nerves: No cranial nerve deficit.      Motor: No abnormal muscle tone.      Coordination: Coordination normal.      Deep Tendon Reflexes: Reflexes are normal and symmetric.   Psychiatric:         Behavior: Behavior normal.         Thought Content: Thought content normal.                Significant Labs: All pertinent labs within the past 24 hours have been reviewed.    Significant Imaging: I have reviewed all pertinent imaging results/findings within the past 24 hours.

## 2023-07-31 NOTE — HPI
The patient is a 71 yo male with CAD s/p CABG 2018, Bilateral carotid stenosis s/p left endarterectomy 2016, Sarcoidosis, Crohn's disease -on Mesalamine, PAF- on Eliquis, S/p PPM, PAD s/p LLE PCI, Gout, Peripheral neuropathy, HTN who presented to ED with RUQ pain x 2 weeks that progressively worsened. The patient reports he has RUQ- worse after eating. He went to [facility] ED for the pain one week ago and was told he has sludge in the gallbladder and to f/u with general surgery. Since then the pain waxed and waned. 2 days ago, he ate BBQ ribs. He noted the pain acutely worsened after that, so he came to ED for evaluation. Pt reports mild fever of 99F, no chills, no N/V.     In the ED, Afebrile, BP low normal, WBC normal. Alk phos 288, AST 89, ALT 71, normal Lipase and T.Bili. Hep C negative. ABD u/s showed gallbladder wall thickening and sludge. + Gilliam sign, No stones, CBD upper limits of normal.   ED provider discussed care with Dr. Rojas with general surgery who recommended to hold Eliquis, IV Abx, IVFs, NPO, will likely need MRCP tomorrow morning.     The patient will be placed in observation under hospital medicine

## 2023-07-31 NOTE — ASSESSMENT & PLAN NOTE
Alk phos 288, AST 89, ALT 71, normal Lipase and T.Bili.   ABD u/s showed gallbladder wall thickening and sludge. + Gilliam sign, No stones, CBD upper limits of normal.   ED provider discussed care with Dr. Rojas with general surgery who recommended to hold Eliquis, IV Abx, IVFs, NPO, will likely need MRCP tomorrow morning.   Pt has an implanted PPM- likely will not be able to have MRCP  Consult general surgery

## 2023-07-31 NOTE — ASSESSMENT & PLAN NOTE
Plan for cholecystectomy possible cholangiogram tomorrow  NPO, IV fluids  IV antibiotics  Hold anticoagulation for surgery   The risks of robotic/laparoscopic cholecystectomy including bleeding, infection, common bile duct injury, bile leak, injury to abdominal organs, failure to alleviate symptoms, pulmonary embolus, deep vein thrombosis, cardiac event, and possibility of conversion to an open operation were explained to the patient.   The nature of the patient's condition, probability of success, risks of refusing treatment, and alternatives and risks of the alternatives were also explained.  The patient verbalized understanding.

## 2023-07-31 NOTE — ASSESSMENT & PLAN NOTE
Patient with Paroxysmal (<7 days) atrial fibrillation which is controlled currently with Amiodarone and Coreg. Patient is currently in sinus rhythm (PACED) .[id number] Score: 2.  Anticoagulation indicated. Anticoagulation done with Eliquis .    Hold Eliquis

## 2023-07-31 NOTE — SUBJECTIVE & OBJECTIVE
No current facility-administered medications on file prior to encounter.     Current Outpatient Medications on File Prior to Encounter   Medication Sig    allopurinoL (ZYLOPRIM) 300 MG tablet Take 300 mg by mouth every morning.    amiodarone (PACERONE) 200 MG Tab Take by mouth once daily.    amLODIPine (NORVASC) 2.5 MG tablet Take 2.5 mg by mouth.    apixaban (ELIQUIS) 5 mg Tab Take 1 tablet (5 mg total) by mouth 2 (two) times daily.    aspirin (ECOTRIN) 81 MG EC tablet Take 81 mg by mouth once daily.    benazepriL (LOTENSIN) 20 MG tablet Take 20 mg by mouth 2 (two) times daily.    carvedilol (COREG) 25 MG tablet Take 25 mg by mouth 2 (two) times daily with meals.    coenzyme Q10 100 mg Chew Take 100 mg by mouth once daily.    ezetimibe (ZETIA) 10 mg tablet Take 10 mg by mouth once daily.    fluticasone propionate (FLONASE) 50 mcg/actuation nasal spray daily as needed.    folic acid (FOLVITE) 1 MG tablet Take 1 mg by mouth once daily.    gabapentin (NEURONTIN) 800 MG tablet Take 800 mg by mouth 3 (three) times daily.    HYDROcodone-acetaminophen (NORCO) 5-325 mg per tablet Take 1 tablet by mouth every 4 (four) hours as needed for Pain.    mesalamine (LIALDA) 1.2 gram TbEC Take 1.2 g by mouth daily with breakfast.    pantoprazole (PROTONIX) 40 MG tablet Take 40 mg by mouth once daily.    pravastatin (PRAVACHOL) 80 MG tablet Take 80 mg by mouth once daily.    colchicine (COLCRYS) 0.6 mg tablet Take 0.6 mg by mouth once daily.    furosemide (LASIX) 40 MG tablet Take 40 mg by mouth 2 (two) times daily.    nozaseptin (NOZIN) nasal  1 each by Each Nostril route On call Procedure (surgery).    PENTASA 500 mg CR capsule Take by mouth 3 (three) times daily.    potassium chloride SA (K-DUR,KLOR-CON) 20 MEQ tablet Take 20 mEq by mouth 2 (two) times daily.    ramipril (ALTACE) 10 MG capsule Take 10 mg by mouth once daily.    sulfacetamide sodium-sulfur (AVAR LS) 10-2 % Foam APPLY ONE GRAM ON THE SKIN DAILY    vitamin B  comp and vit C no.6 (VITAMIN B COMP WITH VIT C NO.6 ORAL) Take by mouth once daily.        Review of patient's allergies indicates:   Allergen Reactions    Infliximab Anaphylaxis    Iodine and iodide containing products Rash     Patient states severe rash similar to a sunburn experienced after CT contrast. Patient with prior history of pre-meds before any contrast studies.      Mercaptopurine analogues (thiopurines)        Past Medical History:   Diagnosis Date    A-fib     CAD (coronary artery disease)     Carotid disease, bilateral     Crohn's disease     Gout     Hypertension     PAD (peripheral artery disease)     Sarcoidosis      Past Surgical History:   Procedure Laterality Date    CARDIAC PACEMAKER PLACEMENT      CAROTID ENDARTERECTOMY Left 2016    COLON RESECTION      due to Crohn's disease    CORONARY ARTERY BYPASS GRAFT  2018    INCISION AND DRAINAGE Left 07/27/2019    Procedure: INCISION AND DRAINAGE;  Surgeon: Jules Clark DO;  Location: [facility];  Service: Orthopedics;  Laterality: Left;    INSERTION OF PACEMAKER      SHOULDER SURGERY Right      Family History       Problem Relation (Age of Onset)    Breast cancer Mother    COPD Mother    Heart attack Father          Tobacco Use    Smoking status: Former     Current packs/day: 0.00    Smokeless tobacco: Never   Substance and Sexual Activity    Alcohol use: Not Currently    Drug use: Never    Sexual activity: Not on file     Review of Systems   Constitutional:  Negative for chills, fever and unexpected weight change.   HENT:  Negative for congestion.    Eyes:  Negative for visual disturbance.   Respiratory:  Negative for shortness of breath.    Cardiovascular:  Negative for chest pain.   Gastrointestinal:  Negative for abdominal distention, abdominal pain, constipation, nausea, rectal pain and vomiting.   Genitourinary:  Negative for dysuria.   Musculoskeletal:  Negative for arthralgias.   Skin:  Negative for rash.   Neurological:  Negative for  light-headedness.   Hematological:  Negative for adenopathy.     Objective:     Vital Signs (Most Recent):  Temp: 97.8 °F (36.6 °C) (07/31/23 0750)  Pulse: 78 (07/31/23 0750)  Resp: 18 (07/31/23 0800)  BP: 127/83 (07/31/23 0750)  SpO2: (!) 94 % (07/31/23 0750) Vital Signs (24h Range):  Temp:  [97.8 °F (36.6 °C)-98.7 °F (37.1 °C)] 97.8 °F (36.6 °C)  Pulse:  [78-87] 78  Resp:  [18-20] 18  SpO2:  [94 %-98 %] 94 %  BP: (102-127)/(58-83) 127/83     Weight: 95.6 kg (210 lb 12.2 oz)  Body mass index is 28.99 kg/m².     Physical Exam  Vitals reviewed.   Constitutional:       Appearance: He is well-developed.   HENT:      Head: Normocephalic and atraumatic.   Cardiovascular:      Rate and Rhythm: Normal rate and regular rhythm.   Pulmonary:      Effort: Pulmonary effort is normal.      Breath sounds: Normal breath sounds.   Abdominal:      General: Bowel sounds are normal. There is no distension.      Palpations: Abdomen is soft.      Tenderness: There is abdominal tenderness (mild right upper quadrant).   Musculoskeletal:      Cervical back: Normal range of motion and neck supple.   Skin:     General: Skin is warm and dry.   Neurological:      Mental Status: He is alert and oriented to person, place, and time.            CBC:   Recent Labs   Lab 07/31/23  0648   WBC 7.61   RBC 3.60*   HGB 10.5*   HCT 34.1*      MCV 95   MCH 29.2   MCHC 30.8*     CMP:   Recent Labs   Lab 07/31/23  0648   GLU 96   CALCIUM 8.9   ALBUMIN 2.9*   PROT 5.9*      K 3.9   CO2 28      BUN 13   CREATININE 1.0   ALKPHOS 237*   ALT 51*   AST 41*   BILITOT 0.9       Significant Diagnostics:  U/S:  Gallbladder: Thickened gallbladder wall with increased vascularity.  Sludge in the fundus versus focal thickening.  Mild gallbladder wall thickening up to 4.7 mm.  Positive sonographic Gilliam sign.  Hypervascularity of the gallbladder wall

## 2023-07-31 NOTE — PLAN OF CARE
Brief Plan of Care Update    Pt admitted earlier this AM for management of RUQ abdominal pain and acute cholecystitis. Started on IVF, IV abx.     Reports abd pain is improved since admission, no nausea, vomiting.     Labs reviewed and show LFTs downtrending, Bili wnl.     Pt evaluated by Gen Surg who are tentatively planning intraop cholangiogram and cholecystectomy in AM. Home Eliquis on hold pending intervention.   Continue IV Zosyn, IV Fluids while NPO     Cindi Carter MD   [facility]

## 2023-07-31 NOTE — CONSULTS
[place] - [facility] Surg  General Surgery  Consult Note    Patient Name: Memo Lew  MRN: [id number]  Code Status: Full Code  Admission Date: 7/30/2023  Hospital Length of Stay: 0 days  Attending Physician: Cindi Carter MD  Primary Care Provider: SAJAN Santana    Patient information was obtained from patient.     Inpatient consult to General surgery  Consult performed by: Kyler Rojas MD  Consult ordered by: Zach Chaves Jr., MD        Subjective:     Principal Problem: Acute cholecystitis    History of Present Illness: 72-year-old male referred for acute cholecystitis.  Patient reports right upper quadrant abdominal pain which has been going on for few weeks.  He was recently seen at outside hospital noted to have gallstones and dilated common bile duct was supposed to be scheduled for MRCP which he never under went.  He reports the pain has progressively worsened causing him to come to the ER.      No current facility-administered medications on file prior to encounter.     Current Outpatient Medications on File Prior to Encounter   Medication Sig    allopurinoL (ZYLOPRIM) 300 MG tablet Take 300 mg by mouth every morning.    amiodarone (PACERONE) 200 MG Tab Take by mouth once daily.    amLODIPine (NORVASC) 2.5 MG tablet Take 2.5 mg by mouth.    apixaban (ELIQUIS) 5 mg Tab Take 1 tablet (5 mg total) by mouth 2 (two) times daily.    aspirin (ECOTRIN) 81 MG EC tablet Take 81 mg by mouth once daily.    benazepriL (LOTENSIN) 20 MG tablet Take 20 mg by mouth 2 (two) times daily.    carvedilol (COREG) 25 MG tablet Take 25 mg by mouth 2 (two) times daily with meals.    coenzyme Q10 100 mg Chew Take 100 mg by mouth once daily.    ezetimibe (ZETIA) 10 mg tablet Take 10 mg by mouth once daily.    fluticasone propionate (FLONASE) 50 mcg/actuation nasal spray daily as needed.    folic acid (FOLVITE) 1 MG tablet Take 1 mg by mouth once daily.    gabapentin (NEURONTIN) 800 MG tablet Take 800 mg by mouth 3 (three) times  daily.    HYDROcodone-acetaminophen (NORCO) 5-325 mg per tablet Take 1 tablet by mouth every 4 (four) hours as needed for Pain.    mesalamine (LIALDA) 1.2 gram TbEC Take 1.2 g by mouth daily with breakfast.    pantoprazole (PROTONIX) 40 MG tablet Take 40 mg by mouth once daily.    pravastatin (PRAVACHOL) 80 MG tablet Take 80 mg by mouth once daily.    colchicine (COLCRYS) 0.6 mg tablet Take 0.6 mg by mouth once daily.    furosemide (LASIX) 40 MG tablet Take 40 mg by mouth 2 (two) times daily.    nozaseptin (NOZIN) nasal  1 each by Each Nostril route On call Procedure (surgery).    PENTASA 500 mg CR capsule Take by mouth 3 (three) times daily.    potassium chloride SA (K-DUR,KLOR-CON) 20 MEQ tablet Take 20 mEq by mouth 2 (two) times daily.    ramipril (ALTACE) 10 MG capsule Take 10 mg by mouth once daily.    sulfacetamide sodium-sulfur (AVAR LS) 10-2 % Foam APPLY ONE GRAM ON THE SKIN DAILY    vitamin B comp and vit C no.6 (VITAMIN B COMP WITH VIT C NO.6 ORAL) Take by mouth once daily.        Review of patient's allergies indicates:   Allergen Reactions    Infliximab Anaphylaxis    Iodine and iodide containing products Rash     Patient states severe rash similar to a sunburn experienced after CT contrast. Patient with prior history of pre-meds before any contrast studies.      Mercaptopurine analogues (thiopurines)        Past Medical History:   Diagnosis Date    A-fib     CAD (coronary artery disease)     Carotid disease, bilateral     Crohn's disease     Gout     Hypertension     PAD (peripheral artery disease)     Sarcoidosis      Past Surgical History:   Procedure Laterality Date    CARDIAC PACEMAKER PLACEMENT      CAROTID ENDARTERECTOMY Left 2016    COLON RESECTION      due to Crohn's disease    CORONARY ARTERY BYPASS GRAFT  2018    INCISION AND DRAINAGE Left 07/27/2019    Procedure: INCISION AND DRAINAGE;  Surgeon: Jules Clark DO;  Location: [facility];  Service: Orthopedics;  Laterality: Left;     INSERTION OF PACEMAKER      SHOULDER SURGERY Right      Family History       Problem Relation (Age of Onset)    Breast cancer Mother    COPD Mother    Heart attack Father          Tobacco Use    Smoking status: Former     Current packs/day: 0.00    Smokeless tobacco: Never   Substance and Sexual Activity    Alcohol use: Not Currently    Drug use: Never    Sexual activity: Not on file     Review of Systems   Constitutional:  Negative for chills, fever and unexpected weight change.   HENT:  Negative for congestion.    Eyes:  Negative for visual disturbance.   Respiratory:  Negative for shortness of breath.    Cardiovascular:  Negative for chest pain.   Gastrointestinal:  Negative for abdominal distention, abdominal pain, constipation, nausea, rectal pain and vomiting.   Genitourinary:  Negative for dysuria.   Musculoskeletal:  Negative for arthralgias.   Skin:  Negative for rash.   Neurological:  Negative for light-headedness.   Hematological:  Negative for adenopathy.     Objective:     Vital Signs (Most Recent):  Temp: 97.8 °F (36.6 °C) (07/31/23 0750)  Pulse: 78 (07/31/23 0750)  Resp: 18 (07/31/23 0800)  BP: 127/83 (07/31/23 0750)  SpO2: (!) 94 % (07/31/23 0750) Vital Signs (24h Range):  Temp:  [97.8 °F (36.6 °C)-98.7 °F (37.1 °C)] 97.8 °F (36.6 °C)  Pulse:  [78-87] 78  Resp:  [18-20] 18  SpO2:  [94 %-98 %] 94 %  BP: (102-127)/(58-83) 127/83     Weight: 95.6 kg (210 lb 12.2 oz)  Body mass index is 28.99 kg/m².     Physical Exam  Vitals reviewed.   Constitutional:       Appearance: He is well-developed.   HENT:      Head: Normocephalic and atraumatic.   Cardiovascular:      Rate and Rhythm: Normal rate and regular rhythm.   Pulmonary:      Effort: Pulmonary effort is normal.      Breath sounds: Normal breath sounds.   Abdominal:      General: Bowel sounds are normal. There is no distension.      Palpations: Abdomen is soft.      Tenderness: There is abdominal tenderness (mild right upper quadrant).    Musculoskeletal:      Cervical back: Normal range of motion and neck supple.   Skin:     General: Skin is warm and dry.   Neurological:      Mental Status: He is alert and oriented to person, place, and time.            CBC:   Recent Labs   Lab 07/31/23  0648   WBC 7.61   RBC 3.60*   HGB 10.5*   HCT 34.1*      MCV 95   MCH 29.2   MCHC 30.8*     CMP:   Recent Labs   Lab 07/31/23  0648   GLU 96   CALCIUM 8.9   ALBUMIN 2.9*   PROT 5.9*      K 3.9   CO2 28      BUN 13   CREATININE 1.0   ALKPHOS 237*   ALT 51*   AST 41*   BILITOT 0.9       Significant Diagnostics:  U/S:  Gallbladder: Thickened gallbladder wall with increased vascularity.  Sludge in the fundus versus focal thickening.  Mild gallbladder wall thickening up to 4.7 mm.  Positive sonographic Gilliam sign.  Hypervascularity of the gallbladder wall    Assessment/Plan:     * Acute cholecystitis  Plan for cholecystectomy possible cholangiogram tomorrow  NPO, IV fluids  IV antibiotics  Hold anticoagulation for surgery   The risks of robotic/laparoscopic cholecystectomy including bleeding, infection, common bile duct injury, bile leak, injury to abdominal organs, failure to alleviate symptoms, pulmonary embolus, deep vein thrombosis, cardiac event, and possibility of conversion to an open operation were explained to the patient.   The nature of the patient's condition, probability of success, risks of refusing treatment, and alternatives and risks of the alternatives were also explained.  The patient verbalized understanding.      Crohn disease  Prior colon resection  Previously on Humira  Asymptomatic    Elevated LFTs  Patient was originally scheduled for MRCP   With patient's pacemaker will likely be unable to undergo MRCP so will tentatively plan for cholangiogram at surgery if LFTs remain elevated  Could require EUS or ERCP if unable to perform cholangiogram or findings consistent with choledocholithiasis    PAF (paroxysmal atrial  fibrillation)  Will need to hold Eliquis for surgery tentatively tomorrow  Medical management    VTE Risk Mitigation (From admission, onward)           Ordered     IP VTE HIGH RISK PATIENT  Once         07/31/23 0028     Place sequential compression device  Until discontinued         07/31/23 0028     Reason for No Pharmacological VTE Prophylaxis  Once        Question:  Reasons:  Answer:  Risk of Bleeding    07/31/23 0028                    Thank you for your consult. I will follow-up with patient. Please contact us if you have any additional questions.     60 minutes of total time spent on the encounter, which includes face to face time and non-face to face time preparing to see the patient (eg, review of tests), Obtaining and/or reviewing separately obtained history, Documenting clinical information in the electronic or other health record, Independently interpreting results (not separately reported) and communicating results to the patient/family/caregiver, or Care coordination (not separately reported).     Kyler Rojas MD  General Surgery  [place] - [facility]

## 2023-07-31 NOTE — ED PROVIDER NOTES
SCRIBE #1 NOTE: I, Alida Cabrera, am scribing for, and in the presence of, Zach Chaves Jr., MD. I have scribed the entire note.       History     Chief Complaint   Patient presents with    Abdominal Pain     Pt reports RUQ abdominal pain. Recently seen at [facility] and US showed gallstones with bile blockage. States the pain has increased significantly and is unbearable at this time. Denies CP or SOB.     Review of patient's allergies indicates:   Allergen Reactions    Infliximab Anaphylaxis    Iodine and iodide containing products Rash     Patient states severe rash similar to a sunburn experienced after CT contrast. Patient with prior history of pre-meds before any contrast studies.      Mercaptopurine analogues (thiopurines)          History of Present Illness     HPI    7/30/2023, 9:13 PM  History obtained from the patient      History of Present Illness: Memo Lew is a 72 y.o. male patient with a PMHx of Crohn's disease, HTN, and anticoagulant use who presents to the Emergency Department for evaluation of RUQ abdominal pain which onset 3 weeks PTA, and worse for the past day. Pt was seen at [facility] on 7/17, where he got an US showing a dilated common bile duct with no evidence of choledocholithiasis. Pt was advised to follow up with his GI doctor for an MRCP. Symptoms are constant and moderate in severity. Pt reports that the pain is worse when eating. Associated sxs include diarrhea. Patient denies any fever, N/V, back pain, dysuria, CP, SOB, and all other sxs at this time. No prior Tx reported. No further complaints or concerns at this time.       Arrival mode: Personal vehicle    PCP: SAJAN Santana        Past Medical History:  Past Medical History:   Diagnosis Date    A-fib     CAD (coronary artery disease)     Carotid disease, bilateral     Crohn's disease     Gout     Hypertension     PAD (peripheral artery disease)     Sarcoidosis        Past Surgical History:  Past Surgical History:   Procedure  Laterality Date    CARDIAC PACEMAKER PLACEMENT      CAROTID ENDARTERECTOMY Left 2016    COLON RESECTION      due to Crohn's disease    CORONARY ARTERY BYPASS GRAFT  2018    INCISION AND DRAINAGE Left 07/27/2019    Procedure: INCISION AND DRAINAGE;  Surgeon: Jules Clark DO;  Location: [facility] OR;  Service: Orthopedics;  Laterality: Left;    INSERTION OF PACEMAKER      SHOULDER SURGERY Right          Family History:  Family History   Problem Relation Age of Onset    Breast cancer Mother     COPD Mother     Heart attack Father        Social History:  Social History     Tobacco Use    Smoking status: Former     Current packs/day: 0.00    Smokeless tobacco: Never   Substance and Sexual Activity    Alcohol use: Not Currently    Drug use: Never    Sexual activity: Not on file        Review of Systems     Review of Systems   Constitutional:  Negative for fever.   HENT:  Negative for sore throat.    Respiratory:  Negative for shortness of breath.    Cardiovascular:  Negative for chest pain.   Gastrointestinal:  Positive for abdominal pain (RUQ) and diarrhea. Negative for nausea and vomiting.   Genitourinary:  Negative for dysuria.   Musculoskeletal:  Negative for back pain.   Skin:  Negative for rash.   Neurological:  Negative for weakness.   Hematological:  Does not bruise/bleed easily.   All other systems reviewed and are negative.       Physical Exam     Initial Vitals [07/30/23 2049]   BP Pulse Resp Temp SpO2   (!) 102/58 87 18 97.9 °F (36.6 °C) 98 %      MAP       --          Physical Exam  Nursing Notes and Vital Signs Reviewed.  Constitutional: Patient is in no acute distress. Well-developed and well-nourished.  Head: Atraumatic. Normocephalic.  Eyes: PERRL. EOM intact. Conjunctivae are not pale. No scleral icterus.  ENT: Mucous membranes are moist. Oropharynx is clear and symmetric.    Neck: Supple. Full ROM. No lymphadenopathy.  Cardiovascular: Regular rate. Irregularly irregular rhythm. No murmurs, rubs, or  gallops. Distal pulses are 2+ and symmetric.  Pulmonary/Chest: No respiratory distress. Clear to auscultation bilaterally. No wheezing or rales.  Abdominal: Soft and non-distended.  RUQ tenderness to palpation consistent with positive Gilliam's sign.  No rebound, guarding, or rigidity. Good bowel sounds.  Genitourinary: No CVA tenderness  Musculoskeletal: Moves all extremities. No obvious deformities. No edema. No calf tenderness.  Skin: Warm and dry.  Neurological:  Alert, awake, and appropriate.  Normal speech.  No acute focal neurological deficits are appreciated.  Psychiatric: Normal affect. Good eye contact. Appropriate in content.     ED Course   Procedures  ED Vital Signs:  Vitals:    08/01/23 1450 08/01/23 1455 08/01/23 1500 08/01/23 1505   BP: (!) 142/76 129/68 (!) 145/71 (!) 145/92   Pulse: 92 89 95 89   Resp: 14 14 20 16   Temp: 97.5 °F (36.4 °C)      TempSrc: Temporal      SpO2: 98% 97% 99% 99%   Weight:       Height:        08/01/23 1515 08/01/23 1530 08/01/23 1540 08/01/23 1600   BP: (!) 145/78 137/69  (!) 156/86   Pulse: 85 89  102   Resp: 19 (!) 24 20 18   Temp:    98.7 °F (37.1 °C)   TempSrc:    Oral   SpO2: (!) 94% (!) 90%  (!) 94%   Weight:       Height:        08/01/23 1620 08/01/23 1730 08/01/23 1915 08/01/23 2003   BP: (!) 156/86      Pulse:  102 98 101   Resp:    16   Temp:       TempSrc:       SpO2:    (!) 93%   Weight:       Height:        08/01/23 2107 08/01/23 2300 08/02/23 0009   BP: 133/75  138/72   Pulse: 97 89 89   Resp: 18  18   Temp: 98.9 °F (37.2 °C)  98.1 °F (36.7 °C)   TempSrc:   Oral   SpO2: 97%  96%   Weight:      Height:          Abnormal Lab Results:  Labs Reviewed   CBC W/ AUTO DIFFERENTIAL - Abnormal; Notable for the following components:       Result Value    RBC 3.95 (*)     Hemoglobin 11.6 (*)     Hematocrit 36.6 (*)     MCHC 31.7 (*)     Immature Grans (Abs) 0.05 (*)     Gran % 75.1 (*)     Lymph % 13.4 (*)     All other components within normal limits   COMPREHENSIVE  METABOLIC PANEL - Abnormal; Notable for the following components:    Albumin 3.2 (*)     Alkaline Phosphatase 288 (*)     AST 89 (*)     ALT 71 (*)     All other components within normal limits   LIPASE   LACTIC ACID, PLASMA   TROPONIN I   HIV 1 / 2 ANTIBODY    Narrative:     Release to patient->Immediate   HEPATITIS C ANTIBODY    Narrative:     Release to patient->Immediate   HEP C VIRUS HOLD SPECIMEN    Narrative:     Release to patient->Immediate        All Lab Results:  Results for orders placed or performed during the hospital encounter of 07/30/23   CBC W/ AUTO DIFFERENTIAL   Result Value Ref Range    WBC 9.81 3.90 - 12.70 K/uL    RBC 3.95 (L) 4.60 - 6.20 M/uL    Hemoglobin 11.6 (L) 14.0 - 18.0 g/dL    Hematocrit 36.6 (L) 40.0 - 54.0 %    MCV 93 82 - 98 fL    MCH 29.4 27.0 - 31.0 pg    MCHC 31.7 (L) 32.0 - 36.0 g/dL    RDW 13.3 11.5 - 14.5 %    Platelets 257 150 - 450 K/uL    MPV 10.9 9.2 - 12.9 fL    Immature Granulocytes 0.5 0.0 - 0.5 %    Gran # (ANC) 7.4 1.8 - 7.7 K/uL    Immature Grans (Abs) 0.05 (H) 0.00 - 0.04 K/uL    Lymph # 1.3 1.0 - 4.8 K/uL    Mono # 0.9 0.3 - 1.0 K/uL    Eos # 0.1 0.0 - 0.5 K/uL    Baso # 0.06 0.00 - 0.20 K/uL    nRBC 0 0 /100 WBC    Gran % 75.1 (H) 38.0 - 73.0 %    Lymph % 13.4 (L) 18.0 - 48.0 %    Mono % 9.6 4.0 - 15.0 %    Eosinophil % 0.8 0.0 - 8.0 %    Basophil % 0.6 0.0 - 1.9 %    Differential Method Automated    Comp. Metabolic Panel   Result Value Ref Range    Sodium 141 136 - 145 mmol/L    Potassium 4.3 3.5 - 5.1 mmol/L    Chloride 104 95 - 110 mmol/L    CO2 27 23 - 29 mmol/L    Glucose 98 70 - 110 mg/dL    BUN 17 8 - 23 mg/dL    Creatinine 1.1 0.5 - 1.4 mg/dL    Calcium 9.7 8.7 - 10.5 mg/dL    Total Protein 6.6 6.0 - 8.4 g/dL    Albumin 3.2 (L) 3.5 - 5.2 g/dL    Total Bilirubin 0.9 0.1 - 1.0 mg/dL    Alkaline Phosphatase 288 (H) 55 - 135 U/L    AST 89 (H) 10 - 40 U/L    ALT 71 (H) 10 - 44 U/L    eGFR >60 >60 mL/min/1.73 m^2    Anion Gap 10 8 - 16 mmol/L   Lipase   Result  Value Ref Range    Lipase 14 4 - 60 U/L   Urinalysis, Reflex to Urine Culture Urine, Clean Catch    Specimen: Urine   Result Value Ref Range    Specimen UA Urine, Clean Catch     Color, UA Yellow Yellow, Straw, Maryana    Appearance, UA Clear Clear    pH, UA 6.0 5.0 - 8.0    Specific Gravity, UA 1.020 1.005 - 1.030    Protein, UA Negative Negative    Glucose, UA Negative Negative    Ketones, UA Negative Negative    Bilirubin (UA) Negative Negative    Occult Blood UA Negative Negative    Nitrite, UA Negative Negative    Urobilinogen, UA Negative <2.0 EU/dL    Leukocytes, UA 1+ (A) Negative   Lactic acid, plasma   Result Value Ref Range    Lactate (Lactic Acid) 0.7 0.5 - 2.2 mmol/L   Troponin I   Result Value Ref Range    Troponin I <0.006 0.000 - 0.026 ng/mL   HIV 1/2 Ag/Ab (4th Gen)   Result Value Ref Range    HIV 1/2 Ag/Ab Negative Negative   Hepatitis C Antibody   Result Value Ref Range    Hepatitis C Ab Negative Negative   HCV Virus Hold Specimen   Result Value Ref Range    HEP C Virus Hold Specimen Hold for HCV sendout    Comprehensive Metabolic Panel (CMP)   Result Value Ref Range    Sodium 140 136 - 145 mmol/L    Potassium 3.9 3.5 - 5.1 mmol/L    Chloride 105 95 - 110 mmol/L    CO2 28 23 - 29 mmol/L    Glucose 96 70 - 110 mg/dL    BUN 13 8 - 23 mg/dL    Creatinine 1.0 0.5 - 1.4 mg/dL    Calcium 8.9 8.7 - 10.5 mg/dL    Total Protein 5.9 (L) 6.0 - 8.4 g/dL    Albumin 2.9 (L) 3.5 - 5.2 g/dL    Total Bilirubin 0.9 0.1 - 1.0 mg/dL    Alkaline Phosphatase 237 (H) 55 - 135 U/L    AST 41 (H) 10 - 40 U/L    ALT 51 (H) 10 - 44 U/L    eGFR >60 >60 mL/min/1.73 m^2    Anion Gap 7 (L) 8 - 16 mmol/L   Magnesium   Result Value Ref Range    Magnesium 1.9 1.6 - 2.6 mg/dL   Phosphorus   Result Value Ref Range    Phosphorus 3.0 2.7 - 4.5 mg/dL   CBC with Automated Differential   Result Value Ref Range    WBC 7.61 3.90 - 12.70 K/uL    RBC 3.60 (L) 4.60 - 6.20 M/uL    Hemoglobin 10.5 (L) 14.0 - 18.0 g/dL    Hematocrit 34.1 (L) 40.0 -  54.0 %    MCV 95 82 - 98 fL    MCH 29.2 27.0 - 31.0 pg    MCHC 30.8 (L) 32.0 - 36.0 g/dL    RDW 13.5 11.5 - 14.5 %    Platelets 220 150 - 450 K/uL    MPV 10.7 9.2 - 12.9 fL    Immature Granulocytes 0.3 0.0 - 0.5 %    Gran # (ANC) 5.3 1.8 - 7.7 K/uL    Immature Grans (Abs) 0.02 0.00 - 0.04 K/uL    Lymph # 1.3 1.0 - 4.8 K/uL    Mono # 0.8 0.3 - 1.0 K/uL    Eos # 0.1 0.0 - 0.5 K/uL    Baso # 0.05 0.00 - 0.20 K/uL    nRBC 0 0 /100 WBC    Gran % 70.2 38.0 - 73.0 %    Lymph % 17.3 (L) 18.0 - 48.0 %    Mono % 10.2 4.0 - 15.0 %    Eosinophil % 1.3 0.0 - 8.0 %    Basophil % 0.7 0.0 - 1.9 %    Differential Method Automated    Urinalysis Microscopic   Result Value Ref Range    WBC, UA 5 0 - 5 /hpf    Microscopic Comment SEE COMMENT    Comprehensive Metabolic Panel (CMP)   Result Value Ref Range    Sodium 139 136 - 145 mmol/L    Potassium 4.0 3.5 - 5.1 mmol/L    Chloride 103 95 - 110 mmol/L    CO2 26 23 - 29 mmol/L    Glucose 85 70 - 110 mg/dL    BUN 13 8 - 23 mg/dL    Creatinine 0.9 0.5 - 1.4 mg/dL    Calcium 9.6 8.7 - 10.5 mg/dL    Total Protein 6.8 6.0 - 8.4 g/dL    Albumin 3.2 (L) 3.5 - 5.2 g/dL    Total Bilirubin 1.2 (H) 0.1 - 1.0 mg/dL    Alkaline Phosphatase 221 (H) 55 - 135 U/L    AST 25 10 - 40 U/L    ALT 43 10 - 44 U/L    eGFR >60 >60 mL/min/1.73 m^2    Anion Gap 10 8 - 16 mmol/L   Magnesium   Result Value Ref Range    Magnesium 1.9 1.6 - 2.6 mg/dL   Phosphorus   Result Value Ref Range    Phosphorus 3.0 2.7 - 4.5 mg/dL   CBC with Automated Differential   Result Value Ref Range    WBC 8.53 3.90 - 12.70 K/uL    RBC 4.07 (L) 4.60 - 6.20 M/uL    Hemoglobin 11.7 (L) 14.0 - 18.0 g/dL    Hematocrit 37.8 (L) 40.0 - 54.0 %    MCV 93 82 - 98 fL    MCH 28.7 27.0 - 31.0 pg    MCHC 31.0 (L) 32.0 - 36.0 g/dL    RDW 13.2 11.5 - 14.5 %    Platelets 258 150 - 450 K/uL    MPV 11.2 9.2 - 12.9 fL    Immature Granulocytes 0.4 0.0 - 0.5 %    Gran # (ANC) 5.9 1.8 - 7.7 K/uL    Immature Grans (Abs) 0.03 0.00 - 0.04 K/uL    Lymph # 1.4 1.0 -  4.8 K/uL    Mono # 1.0 0.3 - 1.0 K/uL    Eos # 0.1 0.0 - 0.5 K/uL    Baso # 0.07 0.00 - 0.20 K/uL    nRBC 0 0 /100 WBC    Gran % 69.3 38.0 - 73.0 %    Lymph % 16.4 (L) 18.0 - 48.0 %    Mono % 11.7 4.0 - 15.0 %    Eosinophil % 1.4 0.0 - 8.0 %    Basophil % 0.8 0.0 - 1.9 %    Differential Method Automated    Echo   Result Value Ref Range    BSA 2.19 m2    LVOT stroke volume 53.58 cm3    LVIDd 5.12 3.5 - 6.0 cm    LV Systolic Volume 39.50 mL    LV Systolic Volume Index 18.3 mL/m2    LVIDs 3.15 2.1 - 4.0 cm    LV Diastolic Volume 124.78 mL    LV Diastolic Volume Index 57.77 mL/m2    IVS 1.22 (A) 0.6 - 1.1 cm    LVOT diameter 2.04 cm    LVOT area 3.3 cm2    FS 38 28 - 44 %    Left Ventricle Relative Wall Thickness 0.41 cm    Posterior Wall 1.05 0.6 - 1.1 cm    TDI LATERAL 0.16 m/s    TDI SEPTAL 0.11 m/s    LV mass 224.71 g    LV Mass Index 104 g/m2    MV Peak E [place] 1.20 m/s    LV LATERAL E/E' RATIO 7.50 m/s    LV SEPTAL E/E' RATIO 10.91 m/s    E/E' ratio 8.89 m/s    TR Max [place] 2.88 m/s    IVRT 64.70 msec    Mean e' 0.14 m/s    LVOT peak tapan 0.70 m/s    Left Ventricular Outflow Tract Mean Velocity 0.58 cm/s    Left Ventricular Outflow Tract Mean Gradient 1.39 mmHg    LA size 4.53 cm    Left Atrium Major Axis 6.29 cm    Left Atrium Minor Axis 6.32 cm    RVOT peak VTI 17.6 cm    RA Major Axis 5.64 cm    AV mean gradient 6 mmHg    AV peak gradient 10 mmHg    Ao peak tapan 1.60 m/s    Ao VTI 30.30 cm    LVOT peak VTI 16.40 cm    AV valve area 1.77 cm²    AV Velocity Ratio 0.44     AV index (prosthetic) 0.54     MALORIE by Velocity Ratio 1.43 cm²    Mr max tapan 3.32 m/s    MV stenosis pressure 1/2 time 62.18 ms    MV valve area p 1/2 method 3.54 cm2    TV mean gradient 30 mmHg    Triscuspid Valve Regurgitation Peak Gradient 33 mmHg    PV mean gradient 2 mmHg    RVOT peak tapan 0.84 m/s    Ao root annulus 3.36 cm    STJ 3.43 cm    Ascending aorta 3.23 cm    ZLVIDS -2.48     ZLVIDD -3.24     IVC diameter 2.31 cm    LA Volume Index 51.7  mL/m2    LA volume 111.68 cm3    LA WIDTH 4.6 cm    Tapse 1.3 cm    RA Width 4.0 cm    RV TB RVSP 11 mmHg    Est. RA pres 8 mmHg         Imaging Results:  Imaging Results              US Abdomen Limited (Final result)  Result time 07/30/23 22:45:18      Final result by Bar Michele MD (07/30/23 22:45:18)                   Impression:      Gallbladder wall thickening with hypervascularity and sludge.  Positive sonographic Gilliam sign.  No echogenic stones.  Top-normal common bile duct measuring 8.2 mm    Small right renal cyst.      Electronically signed by: Bar Michele  Date:    07/30/2023  Time:    22:45               Narrative:    EXAMINATION:  US ABDOMEN LIMITED    CLINICAL HISTORY:  ruq abd ttp;    TECHNIQUE:  Limited ultrasound of the right upper quadrant of the abdomen (including pancreas, liver, gallbladder, common bile duct, and spleen) was performed.    COMPARISON:  None.    FINDINGS:  Liver: Hepatomegaly measuring 19.2 cm.No focal hepatic lesions.    Gallbladder: Thickened gallbladder wall with increased vascularity.  Sludge in the fundus versus focal thickening.  Mild gallbladder wall thickening up to 4.7 mm.  Positive sonographic Gilliam sign.  Hypervascularity of the gallbladder wall    Biliary system: The common duct is top-normal/prominent, measuring 8.2 mm.  No intrahepatic ductal dilatation.    Right kidney: 1.5 x 1.5 x 1.6 right renal cyst.  Measuring 11.0 cm.    Miscellaneous: No upper abdominal ascites.  Pancreas is echogenic and partially obscured.                                       The EKG was ordered, reviewed, and independently interpreted by the ED provider.  Interpretation time: 20:44  Rate: 80 BPM  Rhythm: Undetermined rhythm  Interpretation: Left anterior fascicular block. Possible anterior infarct, age undetermined. No STEMI.           The Emergency Provider reviewed the vital signs and test results, which are outlined above.     ED Discussion     11:58 PM: Discussed pt's case with  "Dr. Rojas (General Surgery) who recommends admission to  and starting sbx and pain meds. Dr. Rojas adds "would have hospital medicine admit hold his eliquis, iv abx, ivfs, npo, will likely need labs and mrcp tomorrow morning".    12:14 AM: Discussed case with Debra Hazel NP (Hospital Medicine). Dr. Moyer agrees with current care and management of pt and accepts admission.   Admitting Service: Hospital Medicine  Admitting Physician: Dr. Moyer  Admit to: Obs med/tele    12:14 AM: Re-evaluated pt. I have discussed test results, shared treatment plan, and the need for admission with patient and family at bedside. Pt and family express understanding at this time and agree with all information. All questions answered. Pt and family have no further questions or concerns at this time. Pt is ready for admit.         Medical Decision Making:   History:   Old Medical Records: I decided to obtain old medical records.  Old Records Summarized: records from another hospital.       <> Summary of Records: US Gallbladder at  Gen on 7/17/23:   (1) dilated common bile duct. 1 cm without evidence of choledocholithiasis. Etiology is unknown. Correlate liver function tests. No intrahepatic ductal dilation. If needed, follow-up MRCP recommended to further assess. (2) Normal gallbladder other than comet tail artifact suggesting cholesterol crystal deposition without gallbladder wall thickening. (3) Small right renal cysts, largest 2 cm. Suspected right renal calculus, 0.5 cm.   Clinical Tests:   Lab Tests: Ordered and Reviewed  Radiological Study: Ordered and Reviewed  Medical Tests: Ordered and Reviewed           ED Medication(s):  Medications   morphine injection 4 mg (4 mg Intravenous Not Given 8/1/23 0715)   ondansetron injection 4 mg (has no administration in time range)   allopurinoL tablet 300 mg (300 mg Oral Not Given 8/1/23 0700)   amiodarone tablet 200 mg (200 mg Oral Given 8/1/23 0919)   aspirin EC tablet 81 mg (81 mg " Oral Not Given 8/1/23 0900)   carvediloL tablet 25 mg (25 mg Oral Given 8/1/23 1620)   folic acid tablet 1 mg (1 mg Oral Not Given 8/1/23 0900)   gabapentin capsule 800 mg (800 mg Oral Given 8/1/23 2109)   pantoprazole EC tablet 40 mg (40 mg Oral Not Given 8/1/23 0900)   pravastatin tablet 80 mg (80 mg Oral Not Given 8/1/23 0900)   sodium chloride 0.9% flush 10 mL (has no administration in time range)   albuterol-ipratropium 2.5 mg-0.5 mg/3 mL nebulizer solution 3 mL (3 mLs Nebulization Given 8/1/23 0845)   melatonin tablet 6 mg (has no administration in time range)   prochlorperazine injection Soln 5 mg (has no administration in time range)   acetaminophen tablet 650 mg (has no administration in time range)   naloxone 0.4 mg/mL injection 0.02 mg (has no administration in time range)   glucose chewable tablet 16 g (has no administration in time range)   glucose chewable tablet 24 g (has no administration in time range)   glucagon (human recombinant) injection 1 mg (has no administration in time range)   dextrose 10% bolus 125 mL 125 mL (has no administration in time range)   dextrose 10% bolus 250 mL 250 mL (has no administration in time range)   piperacillin-tazobactam (ZOSYN) 4.5 g in dextrose 5 % in water (D5W) 100 mL IVPB (MB+) (4.5 g Intravenous New Bag 8/2/23 0118)   diphenhydrAMINE capsule 25 mg (25 mg Oral Given 8/1/23 0023)   HYDROcodone-acetaminophen  mg per tablet 1 tablet (has no administration in time range)   sodium chloride 0.9% bolus 1,000 mL 1,000 mL (0 mLs Intravenous Stopped 7/31/23 0128)   ondansetron injection 4 mg (4 mg Intravenous Given 7/30/23 2204)   morphine injection 2 mg (2 mg Intravenous Given 7/30/23 2204)   piperacillin-tazobactam (ZOSYN) 4.5 g in dextrose 5 % in water (D5W) 100 mL IVPB (MB+) (0 g Intravenous Stopped 7/31/23 0229)   oxyCODONE-acetaminophen 5-325 mg per tablet 1 tablet (1 tablet Oral Given 8/1/23 7199)       Current Discharge Medication List                   Scribe Attestation:   Scribe #1: I performed the above scribed service and the documentation accurately describes the services I performed. I attest to the accuracy of the note.     Attending:   Physician Attestation Statement for Scribe #1: I, Zach Chaves Jr., MD, personally performed the services described in this documentation, as scribed by Alida Cabrera, in my presence, and it is both accurate and complete.           Clinical Impression       ICD-10-CM ICD-9-CM   1. Elevated LFTs  R79.89 790.6   2. Atrial fibrillation  I48.91 427.31   3. Acute cholecystitis  K81.0 575.0   4. Chest pain  R07.9 786.50   5. Coronary artery disease  I25.10 414.00       Disposition:   Disposition: Placed in Observation  Condition: Selma Chaves Jr., MD  08/02/23 0351

## 2023-07-31 NOTE — ASSESSMENT & PLAN NOTE
followed closely by Rheumatology   Stable   Pt is not currently on any disease modifying mediations

## 2023-07-31 NOTE — H&P
[facility]  History & Physical    Patient Name: Memo Lew  MRN: [id number]  Patient Class: OP- Observation  Admission Date: 7/30/2023  Attending Physician: Dr. Moyer  Primary Care Provider: SAJAN Santana         Patient information was obtained from patient and ER records.     Subjective:     Principal Problem:Acute cholecystitis    Chief Complaint:   Chief Complaint   Patient presents with    Abdominal Pain     Pt reports RUQ abdominal pain. Recently seen at [facility] and US showed gallstones with bile blockage. States the pain has increased significantly and is unbearable at this time. Denies CP or SOB.        HPI: The patient is a 71 yo male with CAD s/p CABG 2018, Bilateral carotid stenosis s/p left endarterectomy 2016, Sarcoidosis, Crohn's disease -on Mesalamine, PAF- on Eliquis, S/p PPM, PAD s/p LLE PCI, Gout, Peripheral neuropathy, HTN who presented to ED with RUQ pain x 2 weeks that progressively worsened. The patient reports he has RUQ- worse after eating. He went to [facility] ED for the pain one week ago and was told he has sludge in the gallbladder and to f/u with general surgery. Since then the pain waxed and waned. 2 days ago, he ate BBQ ribs. He noted the pain acutely worsened after that, so he came to ED for evaluation. Pt reports mild fever of 99F, no chills, no N/V.     In the ED, Afebrile, BP low normal, WBC normal. Alk phos 288, AST 89, ALT 71, normal Lipase and T.Bili. Hep C negative. ABD u/s showed gallbladder wall thickening and sludge. + Gilliam sign, No stones, CBD upper limits of normal.   ED provider discussed care with Dr. Rojas with general surgery who recommended to hold Eliquis, IV Abx, IVFs, NPO, will likely need MRCP tomorrow morning.     The patient will be placed in observation under hospital medicine       Past Medical History:   Diagnosis Date    A-fib     CAD (coronary artery disease)     Carotid disease, bilateral     Crohn's disease     Gout      Hypertension     PAD (peripheral artery disease)     Sarcoidosis        Past Surgical History:   Procedure Laterality Date    CARDIAC PACEMAKER PLACEMENT      CAROTID ENDARTERECTOMY Left 2016    COLON RESECTION      due to Crohn's disease    CORONARY ARTERY BYPASS GRAFT  2018    INCISION AND DRAINAGE Left 07/27/2019    Procedure: INCISION AND DRAINAGE;  Surgeon: Jules Clark DO;  Location: [facility];  Service: Orthopedics;  Laterality: Left;    INSERTION OF PACEMAKER      SHOULDER SURGERY Right        Review of patient's allergies indicates:   Allergen Reactions    Infliximab Anaphylaxis    Iodine and iodide containing products Rash     Patient states severe rash similar to a sunburn experienced after CT contrast. Patient with prior history of pre-meds before any contrast studies.      Mercaptopurine analogues (thiopurines)        No current facility-administered medications on file prior to encounter.     Current Outpatient Medications on File Prior to Encounter   Medication Sig    allopurinoL (ZYLOPRIM) 300 MG tablet Take 300 mg by mouth every morning.    amiodarone (PACERONE) 200 MG Tab Take by mouth once daily.    amLODIPine (NORVASC) 2.5 MG tablet Take 2.5 mg by mouth.    apixaban (ELIQUIS) 5 mg Tab Take 1 tablet (5 mg total) by mouth 2 (two) times daily.    aspirin (ECOTRIN) 81 MG EC tablet Take 81 mg by mouth once daily.    benazepriL (LOTENSIN) 20 MG tablet Take 20 mg by mouth 2 (two) times daily.    carvedilol (COREG) 25 MG tablet Take 25 mg by mouth 2 (two) times daily with meals.    coenzyme Q10 100 mg Chew Take 100 mg by mouth once daily.    ezetimibe (ZETIA) 10 mg tablet Take 10 mg by mouth once daily.    fluticasone propionate (FLONASE) 50 mcg/actuation nasal spray daily as needed.    folic acid (FOLVITE) 1 MG tablet Take 1 mg by mouth once daily.    gabapentin (NEURONTIN) 800 MG tablet Take 800 mg by mouth 3 (three) times daily.    HYDROcodone-acetaminophen (NORCO) 5-325  mg per tablet Take 1 tablet by mouth every 4 (four) hours as needed for Pain.    mesalamine (LIALDA) 1.2 gram TbEC Take 1.2 g by mouth daily with breakfast.    pantoprazole (PROTONIX) 40 MG tablet Take 40 mg by mouth once daily.    pravastatin (PRAVACHOL) 80 MG tablet Take 80 mg by mouth once daily.    colchicine (COLCRYS) 0.6 mg tablet Take 0.6 mg by mouth once daily.    furosemide (LASIX) 40 MG tablet Take 40 mg by mouth 2 (two) times daily.    nozaseptin (NOZIN) nasal  1 each by Each Nostril route On call Procedure (surgery).    PENTASA 500 mg CR capsule Take by mouth 3 (three) times daily.    potassium chloride SA (K-DUR,KLOR-CON) 20 MEQ tablet Take 20 mEq by mouth 2 (two) times daily.    ramipril (ALTACE) 10 MG capsule Take 10 mg by mouth once daily.    sulfacetamide sodium-sulfur (AVAR LS) 10-2 % Foam APPLY ONE GRAM ON THE SKIN DAILY    vitamin B comp and vit C no.6 (VITAMIN B COMP WITH VIT C NO.6 ORAL) Take by mouth once daily.      Family History       Problem Relation (Age of Onset)    Breast cancer Mother    COPD Mother    Heart attack Father          Tobacco Use    Smoking status: Former     Current packs/day: 0.00    Smokeless tobacco: Never   Substance and Sexual Activity    Alcohol use: Not Currently    Drug use: Never    Sexual activity: Not on file     Review of Systems   Constitutional:  Positive for activity change. Negative for appetite change, chills, diaphoresis, fatigue and fever.   HENT:  Negative for congestion, nosebleeds, sore throat and trouble swallowing.    Eyes:  Negative for pain, discharge and visual disturbance.   Respiratory:  Negative for apnea, cough, chest tightness, shortness of breath, wheezing and stridor.    Cardiovascular:  Negative for chest pain, palpitations and leg swelling.   Gastrointestinal:  Positive for abdominal pain (RUQ). Negative for abdominal distention, blood in stool, constipation, diarrhea, nausea and vomiting.   Endocrine: Negative  for cold intolerance and heat intolerance.   Genitourinary:  Negative for difficulty urinating, dysuria, flank pain, frequency and urgency.   Musculoskeletal:  Negative for arthralgias, back pain, joint swelling, myalgias, neck pain and neck stiffness.   Skin:  Negative for rash and wound.   Allergic/Immunologic: Negative for food allergies and immunocompromised state.   Neurological:  Negative for dizziness, seizures, syncope, facial asymmetry, weakness, light-headedness and headaches.   Hematological:  Negative for adenopathy.   Psychiatric/Behavioral:  Negative for agitation, behavioral problems and confusion. The patient is not nervous/anxious.      Objective:     Vital Signs (Most Recent):  Temp: 97.9 °F (36.6 °C) (07/30/23 2049)  Pulse: 81 (07/30/23 2132)  Resp: 18 (07/31/23 0150)  BP: 121/60 (07/30/23 2132)  SpO2: 96 % (07/30/23 2132) Vital Signs (24h Range):  Temp:  [97.9 °F (36.6 °C)] 97.9 °F (36.6 °C)  Pulse:  [81-87] 81  Resp:  [18] 18  SpO2:  [96 %-98 %] 96 %  BP: (102-121)/(58-60) 121/60     Weight: 97.6 kg (215 lb 2.7 oz)  Body mass index is 29.59 kg/m².     Physical Exam  Constitutional:       General: He is not in acute distress.     Appearance: He is well-developed. He is not diaphoretic.   HENT:      Head: Normocephalic and atraumatic.      Nose: Nose normal.   Eyes:      General: No scleral icterus.     Conjunctiva/sclera: Conjunctivae normal.   Cardiovascular:      Rate and Rhythm: Normal rate and regular rhythm.      Heart sounds: Normal heart sounds. No murmur heard.     No friction rub. No gallop.      Comments: Implanted PPM Left upper chest   Pulmonary:      Effort: Pulmonary effort is normal. No respiratory distress.      Breath sounds: Normal breath sounds. No stridor. No wheezing or rales.   Chest:      Chest wall: No tenderness.   Abdominal:      General: Bowel sounds are normal. There is no distension.      Palpations: Abdomen is soft.      Tenderness: There is abdominal tenderness  (RUQ). There is no guarding or rebound.   Musculoskeletal:         General: No tenderness or deformity. Normal range of motion.      Cervical back: Normal range of motion and neck supple.   Skin:     General: Skin is warm and dry.      Coloration: Skin is not pale.      Findings: No erythema or rash.   Neurological:      Mental Status: He is alert and oriented to person, place, and time.      Cranial Nerves: No cranial nerve deficit.      Motor: No abnormal muscle tone.      Coordination: Coordination normal.      Deep Tendon Reflexes: Reflexes are normal and symmetric.   Psychiatric:         Behavior: Behavior normal.         Thought Content: Thought content normal.                Significant Labs: All pertinent labs within the past 24 hours have been reviewed.    Significant Imaging: I have reviewed all pertinent imaging results/findings within the past 24 hours.    Assessment/Plan:     * Acute cholecystitis  Alk phos 288, AST 89, ALT 71, normal Lipase and T.Bili.   ABD u/s showed gallbladder wall thickening and sludge. + Gilliam sign, No stones, CBD upper limits of normal.   ED provider discussed care with Dr. Rojas with general surgery who recommended to hold Eliquis, IV Abx, IVFs, NPO, will likely need MRCP tomorrow morning.   Pt has an implanted PPM- likely will not be able to have MRCP  Consult general surgery      Elevated LFTs  Likely 2/2 to above   Hep C negative   Monitor       PAF (paroxysmal atrial fibrillation)  Patient with Paroxysmal (<7 days) atrial fibrillation which is controlled currently with Amiodarone and Coreg. Patient is currently in sinus rhythm (PACED) .[id number] Score: 2.  Anticoagulation indicated. Anticoagulation done with Eliquis .    Hold Eliquis     Pacemaker        PAD (peripheral artery disease)  S/p LLE PCI  Stable   Cont ASA, Statin       Carotid artery disease  S/p Left endarterectomy 2016  Cont ASA, Statin       Gout  Cont Allopurinol       Sarcoidosis of lung  followed closely  by Rheumatology   Stable   Pt is not currently on any disease modifying mediations       Crohn disease  Hold mesalamine for now -restart if no sugery      Essential hypertension  BP low normal   Cont Coreg   Hold Benazepril, Amlodipine, Lasix for now   Monitor       CAD (coronary artery disease)  S/p CABG 2018  Cont ASA, Coreg, Statin   Hold benazepril 2/2 low normal BP         VTE Risk Mitigation (From admission, onward)         Ordered     IP VTE HIGH RISK PATIENT  Once         07/31/23 0028     Place sequential compression device  Until discontinued         07/31/23 0028     Reason for No Pharmacological VTE Prophylaxis  Once        Question:  Reasons:  Answer:  Risk of Bleeding    07/31/23 0028                     On 07/31/2023, patient should be placed in hospital observation services under my care in collaboration with Dr. Moyer.      Debra Hazel NP  Department of Hospital Medicine  '[facility] - Med Surg

## 2023-07-31 NOTE — ED NOTES
Pt resting in ER stretcher, aaox4, rr e/u, NAD noted. Vss noted. Wife at the bedside.  Bed low and locked, call light in reach, side rails up x2. Pt verbalized understanding of status and POC; denies further needs. Will continue to monitor.

## 2023-07-31 NOTE — HPI
72-year-old male referred for acute cholecystitis.  Patient reports right upper quadrant abdominal pain which has been going on for few weeks.  He was recently seen at outside hospital noted to have gallstones and dilated common bile duct was supposed to be scheduled for MRCP which he never under went.  He reports the pain has progressively worsened causing him to come to the ER.

## 2023-07-31 NOTE — PLAN OF CARE
[facility] - Med Surg  Initial Discharge Assessment       Primary Care Provider: SAJAN Santana    Admission Diagnosis: Atrial fibrillation [I48.91]  Acute cholecystitis [K81.0]  Chest pain [R07.9]    Admission Date: 7/30/2023  Expected Discharge Date: 8/1/2023         Payor: MEDICARE / Plan: MEDICARE A ONLY / Product Type: Government /     Extended Emergency Contact Information  Primary Emergency Contact: huang humphries  Mobile Phone: [phone]  Relation: Spouse   needed? No  Secondary Emergency Contact: minervaasad  Mobile Phone: [phone]  Relation: Daughter   needed? No    Discharge Plan A: Home with family         [facility] - [facility] - [place], [place] - [address]  [address] [place] [address]  Phone: [phone] Fax: [phone]    [id number] - [place], [place] - [address] AT SEC OF [facility] [address]  [address]  [place] [address]  Phone: [phone] Fax: [phone]    [facility] - [place], [place] - [address]  [address] [facility]  [place] [address]  Phone: [phone] Fax: [phone]    [facility] - [place], [place] - [address]  [address] [facility]  [facility] [address]  Phone: [phone] Fax: [phone]      Initial Assessment (most recent)       Adult Discharge Assessment - 07/31/23 1155          Discharge Assessment    Assessment Type Discharge Planning Assessment     Confirmed/corrected address, phone number and insurance Yes     Confirmed Demographics Correct on Facesheet     Source of Information patient     When was your last doctors appointment? --   2 WEEKS    Communicated SULMA with patient/caregiver Date not available/Unable to determine     Reason For Admission atrial fibrillation     People in Home spouse     Do you expect to return to your current living situation? Yes     Do you have help at home or someone  to help you manage your care at home? Yes     Who are your caregiver(s) and their phone number(s)? spouse     Prior to hospitilization cognitive status: Alert/Oriented     Current cognitive status: Alert/Oriented     Equipment Currently Used at Home none     Readmission within 30 days? No     Patient currently being followed by outpatient case management? No     Do you currently have service(s) that help you manage your care at home? No     Do you take prescription medications? Yes     Do you have prescription coverage? Yes     Coverage medicare     Do you have any problems affording any of your prescribed medications? No     Is the patient taking medications as prescribed? yes     Who is going to help you get home at discharge? spouse     How do you get to doctors appointments? car, drives self     Are you on dialysis? No     Do you take coumadin? No     Discharge Plan A Home with family

## 2023-08-01 ENCOUNTER — ANESTHESIA EVENT (OUTPATIENT)
Dept: SURGERY | Facility: HOSPITAL | Age: 73
DRG: 418 | End: 2023-08-01
Payer: COMMERCIAL

## 2023-08-01 ENCOUNTER — ANESTHESIA (OUTPATIENT)
Dept: SURGERY | Facility: HOSPITAL | Age: 73
DRG: 418 | End: 2023-08-01
Payer: COMMERCIAL

## 2023-08-01 LAB
ALBUMIN SERPL BCP-MCNC: 3.2 G/DL (ref 3.5–5.2)
ALP SERPL-CCNC: 221 U/L (ref 55–135)
ALT SERPL W/O P-5'-P-CCNC: 43 U/L (ref 10–44)
ANION GAP SERPL CALC-SCNC: 10 MMOL/L (ref 8–16)
AST SERPL-CCNC: 25 U/L (ref 10–40)
BASOPHILS # BLD AUTO: 0.07 K/UL (ref 0–0.2)
BASOPHILS NFR BLD: 0.8 % (ref 0–1.9)
BILIRUB SERPL-MCNC: 1.2 MG/DL (ref 0.1–1)
BUN SERPL-MCNC: 13 MG/DL (ref 8–23)
CALCIUM SERPL-MCNC: 9.6 MG/DL (ref 8.7–10.5)
CHLORIDE SERPL-SCNC: 103 MMOL/L (ref 95–110)
CO2 SERPL-SCNC: 26 MMOL/L (ref 23–29)
CREAT SERPL-MCNC: 0.9 MG/DL (ref 0.5–1.4)
DIFFERENTIAL METHOD: ABNORMAL
EOSINOPHIL # BLD AUTO: 0.1 K/UL (ref 0–0.5)
EOSINOPHIL NFR BLD: 1.4 % (ref 0–8)
ERYTHROCYTE [DISTWIDTH] IN BLOOD BY AUTOMATED COUNT: 13.2 % (ref 11.5–14.5)
EST. GFR  (NO RACE VARIABLE): >60 ML/MIN/1.73 M^2
GLUCOSE SERPL-MCNC: 85 MG/DL (ref 70–110)
HCT VFR BLD AUTO: 37.8 % (ref 40–54)
HGB BLD-MCNC: 11.7 G/DL (ref 14–18)
IMM GRANULOCYTES # BLD AUTO: 0.03 K/UL (ref 0–0.04)
IMM GRANULOCYTES NFR BLD AUTO: 0.4 % (ref 0–0.5)
LYMPHOCYTES # BLD AUTO: 1.4 K/UL (ref 1–4.8)
LYMPHOCYTES NFR BLD: 16.4 % (ref 18–48)
MAGNESIUM SERPL-MCNC: 1.9 MG/DL (ref 1.6–2.6)
MCH RBC QN AUTO: 28.7 PG (ref 27–31)
MCHC RBC AUTO-ENTMCNC: 31 G/DL (ref 32–36)
MCV RBC AUTO: 93 FL (ref 82–98)
MONOCYTES # BLD AUTO: 1 K/UL (ref 0.3–1)
MONOCYTES NFR BLD: 11.7 % (ref 4–15)
NEUTROPHILS # BLD AUTO: 5.9 K/UL (ref 1.8–7.7)
NEUTROPHILS NFR BLD: 69.3 % (ref 38–73)
NRBC BLD-RTO: 0 /100 WBC
PHOSPHATE SERPL-MCNC: 3 MG/DL (ref 2.7–4.5)
PLATELET # BLD AUTO: 258 K/UL (ref 150–450)
PMV BLD AUTO: 11.2 FL (ref 9.2–12.9)
POTASSIUM SERPL-SCNC: 4 MMOL/L (ref 3.5–5.1)
PROT SERPL-MCNC: 6.8 G/DL (ref 6–8.4)
RBC # BLD AUTO: 4.07 M/UL (ref 4.6–6.2)
SODIUM SERPL-SCNC: 139 MMOL/L (ref 136–145)
WBC # BLD AUTO: 8.53 K/UL (ref 3.9–12.7)

## 2023-08-01 PROCEDURE — 83735 ASSAY OF MAGNESIUM: CPT | Performed by: NURSE PRACTITIONER

## 2023-08-01 PROCEDURE — 84100 ASSAY OF PHOSPHORUS: CPT | Performed by: NURSE PRACTITIONER

## 2023-08-01 PROCEDURE — 25000003 PHARM REV CODE 250: Performed by: INTERNAL MEDICINE

## 2023-08-01 PROCEDURE — 25000003 PHARM REV CODE 250: Performed by: NURSE ANESTHETIST, CERTIFIED REGISTERED

## 2023-08-01 PROCEDURE — 63600175 PHARM REV CODE 636 W HCPCS: Performed by: NURSE ANESTHETIST, CERTIFIED REGISTERED

## 2023-08-01 PROCEDURE — 94761 N-INVAS EAR/PLS OXIMETRY MLT: CPT

## 2023-08-01 PROCEDURE — 71000033 HC RECOVERY, INTIAL HOUR: Performed by: SURGERY

## 2023-08-01 PROCEDURE — 25000003 PHARM REV CODE 250: Performed by: SURGERY

## 2023-08-01 PROCEDURE — 99232 SBSQ HOSP IP/OBS MODERATE 35: CPT | Mod: 57,,, | Performed by: SURGERY

## 2023-08-01 PROCEDURE — 47562 LAPAROSCOPIC CHOLECYSTECTOMY: CPT | Mod: ,,, | Performed by: SURGERY

## 2023-08-01 PROCEDURE — 94640 AIRWAY INHALATION TREATMENT: CPT

## 2023-08-01 PROCEDURE — 25000003 PHARM REV CODE 250: Performed by: ANESTHESIOLOGY

## 2023-08-01 PROCEDURE — 63600175 PHARM REV CODE 636 W HCPCS: Performed by: NURSE PRACTITIONER

## 2023-08-01 PROCEDURE — 36000710: Performed by: SURGERY

## 2023-08-01 PROCEDURE — 88304 PR  SURG PATH,LEVEL III: ICD-10-PCS | Mod: 26,,, | Performed by: STUDENT IN AN ORGANIZED HEALTH CARE EDUCATION/TRAINING PROGRAM

## 2023-08-01 PROCEDURE — 11000001 HC ACUTE MED/SURG PRIVATE ROOM

## 2023-08-01 PROCEDURE — 96366 THER/PROPH/DIAG IV INF ADDON: CPT

## 2023-08-01 PROCEDURE — 80053 COMPREHEN METABOLIC PANEL: CPT | Performed by: NURSE PRACTITIONER

## 2023-08-01 PROCEDURE — 63600175 PHARM REV CODE 636 W HCPCS: Performed by: SURGERY

## 2023-08-01 PROCEDURE — 36000711: Performed by: SURGERY

## 2023-08-01 PROCEDURE — 37000009 HC ANESTHESIA EA ADD 15 MINS: Performed by: SURGERY

## 2023-08-01 PROCEDURE — 96361 HYDRATE IV INFUSION ADD-ON: CPT

## 2023-08-01 PROCEDURE — 21400001 HC TELEMETRY ROOM

## 2023-08-01 PROCEDURE — 25000242 PHARM REV CODE 250 ALT 637 W/ HCPCS: Performed by: NURSE PRACTITIONER

## 2023-08-01 PROCEDURE — 25000003 PHARM REV CODE 250: Performed by: NURSE PRACTITIONER

## 2023-08-01 PROCEDURE — 36415 COLL VENOUS BLD VENIPUNCTURE: CPT | Performed by: NURSE PRACTITIONER

## 2023-08-01 PROCEDURE — 88304 TISSUE EXAM BY PATHOLOGIST: CPT | Performed by: STUDENT IN AN ORGANIZED HEALTH CARE EDUCATION/TRAINING PROGRAM

## 2023-08-01 PROCEDURE — 37000008 HC ANESTHESIA 1ST 15 MINUTES: Performed by: SURGERY

## 2023-08-01 PROCEDURE — G0378 HOSPITAL OBSERVATION PER HR: HCPCS

## 2023-08-01 PROCEDURE — 27201423 OPTIME MED/SURG SUP & DEVICES STERILE SUPPLY: Performed by: SURGERY

## 2023-08-01 PROCEDURE — 47562 PR LAP,CHOLECYSTECTOMY: ICD-10-PCS | Mod: ,,, | Performed by: SURGERY

## 2023-08-01 PROCEDURE — 99232 PR SUBSEQUENT HOSPITAL CARE,LEVL II: ICD-10-PCS | Mod: 57,,, | Performed by: SURGERY

## 2023-08-01 PROCEDURE — 88304 TISSUE EXAM BY PATHOLOGIST: CPT | Mod: 26,,, | Performed by: STUDENT IN AN ORGANIZED HEALTH CARE EDUCATION/TRAINING PROGRAM

## 2023-08-01 PROCEDURE — 85025 COMPLETE CBC W/AUTO DIFF WBC: CPT | Performed by: NURSE PRACTITIONER

## 2023-08-01 RX ORDER — BUPIVACAINE HYDROCHLORIDE 2.5 MG/ML
INJECTION, SOLUTION EPIDURAL; INFILTRATION; INTRACAUDAL
Status: DISCONTINUED | OUTPATIENT
Start: 2023-08-01 | End: 2023-08-01 | Stop reason: HOSPADM

## 2023-08-01 RX ORDER — LIDOCAINE HYDROCHLORIDE 20 MG/ML
INJECTION, SOLUTION EPIDURAL; INFILTRATION; INTRACAUDAL; PERINEURAL
Status: DISCONTINUED | OUTPATIENT
Start: 2023-08-01 | End: 2023-08-01

## 2023-08-01 RX ORDER — LIDOCAINE HYDROCHLORIDE 10 MG/ML
INJECTION, SOLUTION EPIDURAL; INFILTRATION; INTRACAUDAL; PERINEURAL
Status: DISCONTINUED | OUTPATIENT
Start: 2023-08-01 | End: 2023-08-01 | Stop reason: HOSPADM

## 2023-08-01 RX ORDER — DEXAMETHASONE SODIUM PHOSPHATE 4 MG/ML
INJECTION, SOLUTION INTRA-ARTICULAR; INTRALESIONAL; INTRAMUSCULAR; INTRAVENOUS; SOFT TISSUE
Status: DISCONTINUED | OUTPATIENT
Start: 2023-08-01 | End: 2023-08-01

## 2023-08-01 RX ORDER — FENTANYL CITRATE 50 UG/ML
INJECTION, SOLUTION INTRAMUSCULAR; INTRAVENOUS
Status: DISCONTINUED | OUTPATIENT
Start: 2023-08-01 | End: 2023-08-01

## 2023-08-01 RX ORDER — PROPOFOL 10 MG/ML
VIAL (ML) INTRAVENOUS
Status: DISCONTINUED | OUTPATIENT
Start: 2023-08-01 | End: 2023-08-01

## 2023-08-01 RX ORDER — ROCURONIUM BROMIDE 10 MG/ML
INJECTION, SOLUTION INTRAVENOUS
Status: DISCONTINUED | OUTPATIENT
Start: 2023-08-01 | End: 2023-08-01

## 2023-08-01 RX ORDER — HYDROCODONE BITARTRATE AND ACETAMINOPHEN 10; 325 MG/1; MG/1
1 TABLET ORAL EVERY 4 HOURS PRN
Status: DISCONTINUED | OUTPATIENT
Start: 2023-08-01 | End: 2023-08-02 | Stop reason: HOSPADM

## 2023-08-01 RX ORDER — ONDANSETRON 2 MG/ML
INJECTION INTRAMUSCULAR; INTRAVENOUS
Status: DISCONTINUED | OUTPATIENT
Start: 2023-08-01 | End: 2023-08-01

## 2023-08-01 RX ORDER — OXYCODONE AND ACETAMINOPHEN 5; 325 MG/1; MG/1
1 TABLET ORAL ONCE AS NEEDED
Status: COMPLETED | OUTPATIENT
Start: 2023-08-01 | End: 2023-08-01

## 2023-08-01 RX ADMIN — ROCURONIUM BROMIDE 40 MG: 10 INJECTION, SOLUTION INTRAVENOUS at 01:08

## 2023-08-01 RX ADMIN — LIDOCAINE HYDROCHLORIDE 100 MG: 20 INJECTION, SOLUTION EPIDURAL; INFILTRATION; INTRACAUDAL; PERINEURAL at 01:08

## 2023-08-01 RX ADMIN — SUGAMMADEX 200 MG: 100 INJECTION, SOLUTION INTRAVENOUS at 02:08

## 2023-08-01 RX ADMIN — ONDANSETRON 4 MG: 2 INJECTION INTRAMUSCULAR; INTRAVENOUS at 02:08

## 2023-08-01 RX ADMIN — SODIUM CHLORIDE, SODIUM LACTATE, POTASSIUM CHLORIDE, AND CALCIUM CHLORIDE: .6; .31; .03; .02 INJECTION, SOLUTION INTRAVENOUS at 02:08

## 2023-08-01 RX ADMIN — CARVEDILOL 25 MG: 12.5 TABLET, FILM COATED ORAL at 04:08

## 2023-08-01 RX ADMIN — PIPERACILLIN SODIUM AND TAZOBACTAM SODIUM 4.5 G: 4; .5 INJECTION, POWDER, FOR SOLUTION INTRAVENOUS at 02:08

## 2023-08-01 RX ADMIN — PIPERACILLIN SODIUM AND TAZOBACTAM SODIUM 4.5 G: 4; .5 INJECTION, POWDER, FOR SOLUTION INTRAVENOUS at 05:08

## 2023-08-01 RX ADMIN — ROCURONIUM BROMIDE 10 MG: 10 INJECTION, SOLUTION INTRAVENOUS at 01:08

## 2023-08-01 RX ADMIN — SODIUM CHLORIDE, POTASSIUM CHLORIDE, SODIUM LACTATE AND CALCIUM CHLORIDE: 600; 310; 30; 20 INJECTION, SOLUTION INTRAVENOUS at 04:08

## 2023-08-01 RX ADMIN — SODIUM CHLORIDE, SODIUM LACTATE, POTASSIUM CHLORIDE, AND CALCIUM CHLORIDE: .6; .31; .03; .02 INJECTION, SOLUTION INTRAVENOUS at 12:08

## 2023-08-01 RX ADMIN — ROCURONIUM BROMIDE 10 MG: 10 INJECTION, SOLUTION INTRAVENOUS at 02:08

## 2023-08-01 RX ADMIN — PIPERACILLIN SODIUM AND TAZOBACTAM SODIUM 4.5 G: 4; .5 INJECTION, POWDER, FOR SOLUTION INTRAVENOUS at 10:08

## 2023-08-01 RX ADMIN — AMIODARONE HYDROCHLORIDE 200 MG: 200 TABLET ORAL at 09:08

## 2023-08-01 RX ADMIN — DEXAMETHASONE SODIUM PHOSPHATE 8 MG: 4 INJECTION, SOLUTION INTRA-ARTICULAR; INTRALESIONAL; INTRAMUSCULAR; INTRAVENOUS; SOFT TISSUE at 01:08

## 2023-08-01 RX ADMIN — DIPHENHYDRAMINE HYDROCHLORIDE 25 MG: 25 CAPSULE ORAL at 12:08

## 2023-08-01 RX ADMIN — OXYCODONE HYDROCHLORIDE AND ACETAMINOPHEN 1 TABLET: 5; 325 TABLET ORAL at 03:08

## 2023-08-01 RX ADMIN — PROPOFOL 100 MG: 10 INJECTION, EMULSION INTRAVENOUS at 01:08

## 2023-08-01 RX ADMIN — GABAPENTIN 800 MG: 400 CAPSULE ORAL at 04:08

## 2023-08-01 RX ADMIN — FENTANYL CITRATE 50 MCG: 50 INJECTION, SOLUTION INTRAMUSCULAR; INTRAVENOUS at 01:08

## 2023-08-01 RX ADMIN — GABAPENTIN 800 MG: 400 CAPSULE ORAL at 09:08

## 2023-08-01 RX ADMIN — CARVEDILOL 25 MG: 12.5 TABLET, FILM COATED ORAL at 09:08

## 2023-08-01 RX ADMIN — IPRATROPIUM BROMIDE AND ALBUTEROL SULFATE 3 ML: .5; 3 SOLUTION RESPIRATORY (INHALATION) at 08:08

## 2023-08-01 NOTE — ANESTHESIA PREPROCEDURE EVALUATION
08/01/2023  Memo Lew is a 72 y.o., male.    Patient Active Problem List   Diagnosis    Open dislocation of left elbow    PAF (paroxysmal atrial fibrillation)    CAD (coronary artery disease)    Essential hypertension    Acute cholecystitis    Elevated LFTs    Crohn disease    Sarcoidosis of lung    Gout    Carotid artery disease    PAD (peripheral artery disease)    Pacemaker     Past Surgical History:   Procedure Laterality Date    CARDIAC PACEMAKER PLACEMENT      CAROTID ENDARTERECTOMY Left 2016    COLON RESECTION      due to Crohn's disease    CORONARY ARTERY BYPASS GRAFT  2018    INCISION AND DRAINAGE Left 07/27/2019    Procedure: INCISION AND DRAINAGE;  Surgeon: Jules Clark DO;  Location: [facility];  Service: Orthopedics;  Laterality: Left;    INSERTION OF PACEMAKER      SHOULDER SURGERY Right        Pre-op Assessment    I have reviewed the Patient Summary Reports.    I have reviewed the NPO Status.   I have reviewed the Medications.     Review of Systems  Anesthesia Hx:  No problems with previous Anesthesia    Social:  Non-Smoker    Hematology/Oncology:  Hematology Normal        Cardiovascular:   Pacemaker Hypertension CAD  CABG/stent  PVD ECG has been reviewed. PAF (paroxysmal atrial fibrillation)   Pulmonary:  Pulmonary Normal    Renal/:  Renal/ Normal     Hepatic/GI:  Hepatic/GI Normal    Neurological:  Neurology Normal    Endocrine:  Endocrine Normal        Physical Exam  General: Well nourished    Airway:  Mallampati: II   Mouth Opening: Normal  TM Distance: Normal  Neck ROM: Normal ROM    Dental:  Edentulous        Anesthesia Plan  Type of Anesthesia, risks & benefits discussed:    Anesthesia Type: Gen ETT  Intra-op Monitoring Plan: Standard ASA Monitors  Post Op Pain Control Plan: multimodal analgesia  Induction:  IV  Airway Plan: , Post-Induction  Informed  Consent: Informed consent signed with the Patient and all parties understand the risks and agree with anesthesia plan.  All questions answered.   ASA Score: 3    Ready For Surgery From Anesthesia Perspective.     .    Chemistry        Component Value Date/Time     08/01/2023 0436    K 4.0 08/01/2023 0436     08/01/2023 0436    CO2 26 08/01/2023 0436    BUN 13 08/01/2023 0436    CREATININE 0.9 08/01/2023 0436    GLU 85 08/01/2023 0436        Component Value Date/Time    CALCIUM 9.6 08/01/2023 0436    ALKPHOS 221 (H) 08/01/2023 0436    AST 25 08/01/2023 0436    ALT 43 08/01/2023 0436    BILITOT 1.2 (H) 08/01/2023 0436    ESTGFRAFRICA >60 07/30/2019 0827    EGFRNONAA >60 07/30/2019 0827        Lab Results   Component Value Date    WBC 8.53 08/01/2023    HGB 11.7 (L) 08/01/2023    HCT 37.8 (L) 08/01/2023    MCV 93 08/01/2023     08/01/2023          Atrial fibrillation   Left anterior fascicular block   Possible Anterior infarct ,age undetermined   Abnormal ECG     Echo 7/31/23:    Left Ventricle: The left ventricle is normal in size. Normal wall thickness. Normal wall motion. There is normal systolic function with a visually estimated ejection fraction of 55 - 70%. There is diastolic dysfunction.    Left Atrium: Left atrium is severely dilated.    Right Ventricle: Normal right ventricular cavity size. Wall thickness is normal. Right ventricle wall motion  is normal. Systolic function is normal.    Aortic Valve: There is aortic valve sclerosis. Mildly calcified cusps.    Mitral Valve: There is no stenosis.    Tricuspid Valve: There is mild transvalvular regurgitation.    IVC/SVC: Intermediate venous pressure at 8 mmHg.    Pericardium: There is no pericardial effusion.

## 2023-08-01 NOTE — ASSESSMENT & PLAN NOTE
Cont Coreg   Hold Benazepril, Amlodipine, Lasix for now periop, resume lasix postop pending trends   Monitor

## 2023-08-01 NOTE — ANESTHESIA PROCEDURE NOTES
Intubation    Date/Time: 8/1/2023 1:16 PM    Performed by: Joan Rowley CRNA  Authorized by: Kvng Fernández MD    Intubation:     Induction:  Intravenous    Intubated:  Postinduction    Mask Ventilation:  Moderately difficult with oral airway    Attempts:  1    Attempted By:  CRNA and student    Blade:  Horton 2    Laryngeal View Grade: Grade I - full view of cords      Difficult Airway Encountered?: No      Complications:  None    Airway Device:  Oral endotracheal tube    Airway Device Size:  7.5    Style/Cuff Inflation:  Cuffed (inflated to minimal occlusive pressure)    Tube secured:  23    Secured at:  The lips    Placement Verified By:  Capnometry and Revisualization with laryngoscopy    Complicating Factors:  None    Findings Post-Intubation:  BS equal bilateral and atraumatic/condition of teeth unchanged

## 2023-08-01 NOTE — PLAN OF CARE
Discussed poc with pt, pt verbalized understanding    Purposeful rounding every 2hours    VS wnl  Cardiac monitoring in use, pt is NSR, tele monitor # [id number]    Fall precautions in place, remains injury free  Pain under control with PRN meds    IVFs  Accurate I&Os  Abx given as prescribed  Bed locked at lowest position  Call light within reach    Chart check complete  Will cont with POC

## 2023-08-01 NOTE — ANESTHESIA POSTPROCEDURE EVALUATION
Anesthesia Post Evaluation    Patient: Memo Lew    Procedure(s) Performed: Procedure(s) (LRB):  XI ROBOTIC CHOLECYSTECTOMY (N/A)  LYSIS, ADHESIONS, LAPAROSCOPIC (N/A)    Final Anesthesia Type: general      Patient location during evaluation: PACU  Patient participation: Yes- Able to Participate  Level of consciousness: awake and alert  Post-procedure vital signs: reviewed and stable  Pain management: adequate  Airway patency: patent  SJ mitigation strategies: Verification of full reversal of neuromuscular block  PONV status at discharge: No PONV  Anesthetic complications: no      Cardiovascular status: hemodynamically stable  Respiratory status: spontaneous ventilation  Hydration status: euvolemic  Follow-up not needed.          Vitals Value Taken Time   /78 08/01/23 1515   Temp 36.4 °C (97.5 °F) 08/01/23 1450   Pulse 79 08/01/23 1519   Resp 17 08/01/23 1519   SpO2 98 % 08/01/23 1519   Vitals shown include unvalidated device data.      No case tracking events are documented in the log.      Pain/Amarilys Score: Pain Rating Prior to Med Admin: 7 (7/31/2023 10:46 PM)  Pain Rating Post Med Admin: 3 (7/31/2023  4:56 PM)  Amarilys Score: 9 (8/1/2023  3:00 PM)

## 2023-08-01 NOTE — ASSESSMENT & PLAN NOTE
followed closely by Rheumatology   sats stable on room air at this time   Pt is not currently on any disease modifying mediations

## 2023-08-01 NOTE — TRANSFER OF CARE
"Anesthesia Transfer of Care Note    Patient: Memo Lew    Procedure(s) Performed: Procedure(s) (LRB):  XI ROBOTIC CHOLECYSTECTOMY (N/A)  LYSIS, ADHESIONS, LAPAROSCOPIC (N/A)    Patient location: PACU    Anesthesia Type: general    Transport from OR: Transported from OR on room air with adequate spontaneous ventilation    Post pain: adequate analgesia    Post assessment: no apparent anesthetic complications    Post vital signs: stable    Level of consciousness: sedated    Nausea/Vomiting: no nausea/vomiting    Complications: none    Transfer of care protocol was followed      Last vitals:   Visit Vitals  BP (!) 153/81 (Patient Position: Lying)   Pulse 93   Temp 36.8 °C (98.3 °F) (Oral)   Resp 18   Ht 5' 11.5" (1.816 m)   Wt 95.3 kg (210 lb)   SpO2 95%   BMI 28.88 kg/m²     "

## 2023-08-01 NOTE — HOSPITAL COURSE
Pt unable to obtain MRI/MRCP due to PPM. Underwent laparascopic cholecystectomy on 8/01 with Gen Surg Dr. Rojas. He tolerated procedure well, LFTs normalized on POD1. He was able to tolerate diet without nausea, vomiting. + flatus. Discussed with Gen Surg Dr. Rojas, pt deemed stable for discharge from Gen Surg standpoint but if patient were to have elevation in LFTs, will require GI evaluation for endoscopic ultrasound as he had mildly dilated common bile duct on imaging.  Pt reports that he sees GI Dr. Wright next week. He was advised to have LFTs checked with GI on follow up to determine need for EUS.     PT will follow up with PCP within 3-5 days, GI within 1 week and Gen Surg in 2 weeks for incision check. Pt seen and examined on day of discharge. Return precautions and followup instructions discussed at length, pt expressed understanding, all questions answered. Pt appears stable for discharge home today per my exam.

## 2023-08-01 NOTE — OP NOTE
[facility] - Surgery ([facility])  Surgery Department  Operative Note    SUMMARY     Date of Procedure: 8/1/2023     Procedure:   Laparoscopic lysis of adhesions  Robotic cholecystectomy    Surgeon(s) and Role:     * Terrell Fairbanks MD - Primary    Assistant: KAYLA Villanueva     Pre-Operative Diagnosis: Acute cholecystitis [K81.0]    Post-Operative Diagnosis: Post-Op Diagnosis Codes:     * Acute cholecystitis [K81.0]    Anesthesia: General    Operative Findings (including complications, if any): Laparoscopic lysis of adhesions  Robotic cholecystectomy    Description of Technical Procedures:   Gallbladder    Significant Surgical Tasks Conducted by the Assistant(s), if Applicable:  Assistance with laparoscopy and closure    Estimated Blood Loss (EBL):  10 cc           Implants: * No implants in log *    Specimens:   Specimen (24h ago, onward)       Start     Ordered    08/01/23 1433  Specimen to Pathology, Surgery General Surgery  Once        Comments: Pre-op Diagnosis: Acute cholecystitis [K81.0]Procedure(s):XI ROBOTIC CHOLECYSTECTOMY Number of specimens: 1Name of specimens: 1. Gallbladder with stones- PERM     References:    Click here for ordering Quick Tip   Question Answer Comment   Procedure Type: General Surgery    Specimen Class: Routine/Screening    Which provider would you like to cc? TERRELL FAIRBANKS    Release to patient Immediate        08/01/23 1434                            Condition: Good    Disposition: PACU - hemodynamically stable.    Procedure in detail:   The patient was brought to the OR and underwent general anesthesia. The abdomen was prepped and draped in usual sterile fashion.  An incision was made just above the umbilicus.  Fascia grasped and elevated.  A Veress needle was inserted and insufflation obtained to 15 mm Hg.  An 8 mm robotic Optiview port was placed at left mid abdomen. The laparoscope was inserted. There was no evidence of a vascular or enteric injury.     Additional trocars were  placed as follows under visualization. An 8 mm trocar was placed in the anterior axillary line of the right mid abdomen. An 8 mm trocar was placed in the midclavicular line of the right midabdomen.       Omental adhesions to the abdominal wall were then lysed using laparoscopic LigaSure.  Once cleared we are able to place our supraumbilical port.  An 8 mm robotic port was placed at this site.      The patient was placed in reverse Trendelenburg position and rolled to the left. The patient was docked to the da Gisela robot.         The fundus of the gallbladder was retracted cephalad. The gallbladder infundibulum was retracted laterally.      Through meticulous dissection the cystic duct was dissected circumferentially. The cystic artery was dissected circumferentially and the neck of the gallbladder was then dissected off the gallbladder bed. This cleared Calots triangle of all loose areolar tissue and the critical view was obtained.     Indocyanine green with firefly technology was used to elucidate the course of the cystic duct and biliary anatomy.      The cystic artery was clipped with 2 clips proximally 1 clip distally and transected.  The cystic duct was clipped twice proximally and once distally and divided.     The gallbladder was dissected free from the gallbladder bed.    The gallbladder bed was inspected and hemostasis was ensured using hook electrocautery. The area was irrigated until clear.     The umbilical operating arm of the robot was then removed and an Endo Catch bag was inserted. The gallbladder was placed in Endo Catch bag. The patient was then undocked from the da Gisela operating robot. The gallbladder was extracted.      The trocars were removed under direct vision and no bleeding was noted. The abdomen was then desufflated. Marcaine was infiltrated. All incisions were closed with 4-0 Monocryl in a subcuticular manner. Dermaflex was applied to the incision sites

## 2023-08-01 NOTE — SUBJECTIVE & OBJECTIVE
Interval History: NAEON. PT continues to have R sided abd pain, worse with movement, improved at rest. Denies CP, SOB, n/v. Is NPO for planned surgical intervention today     Review of Systems  Objective:     Vital Signs (Most Recent):  Temp: 97.2 °F (36.2 °C) (08/01/23 0809)  Pulse: 99 (08/01/23 0845)  Resp: 18 (08/01/23 0845)  BP: (!) 149/77 (08/01/23 0809)  SpO2: 96 % (08/01/23 0845) Vital Signs (24h Range):  Temp:  [97.2 °F (36.2 °C)-98.3 °F (36.8 °C)] 97.2 °F (36.2 °C)  Pulse:  [71-99] 99  Resp:  [16-18] 18  SpO2:  [91 %-96 %] 96 %  BP: (122-155)/(56-98) 149/77     Weight: 95.3 kg (210 lb)  Body mass index is 28.88 kg/m².    Intake/Output Summary (Last 24 hours) at 8/1/2023 0934  Last data filed at 7/31/2023 1812  Gross per 24 hour   Intake 394.8 ml   Output --   Net 394.8 ml         Physical Exam  Vitals and nursing note reviewed.   Constitutional:       General: He is not in acute distress.     Appearance: Normal appearance. He is not ill-appearing.   Cardiovascular:      Rate and Rhythm: Normal rate. Rhythm irregular.      Heart sounds: No murmur heard.     No friction rub. No gallop.   Pulmonary:      Effort: Pulmonary effort is normal.      Breath sounds: Normal breath sounds. No wheezing, rhonchi or rales.   Abdominal:      General: Bowel sounds are normal. There is no distension.      Palpations: Abdomen is soft.      Tenderness: There is abdominal tenderness (RUQ TTP). There is no guarding or rebound.   Musculoskeletal:      Right lower leg: No edema.      Left lower leg: No edema.   Neurological:      Mental Status: He is alert and oriented to person, place, and time. Mental status is at baseline.             Significant Labs: All pertinent labs within the past 24 hours have been reviewed.    Significant Imaging: I have reviewed all pertinent imaging results/findings within the past 24 hours.

## 2023-08-01 NOTE — ASSESSMENT & PLAN NOTE
Patient with Paroxysmal (<7 days) atrial fibrillation which is controlled currently with Amiodarone and Coreg. Patient is currently in sinus rhythm (PACED) .[id number] Score: 2.  Anticoagulation indicated. Anticoagulation done with Eliquis .  Hx PPM   Eliquis held pending surg intervention, will resume postop   Continue home Amio and Coreg

## 2023-08-01 NOTE — PROGRESS NOTES
[facility] Medicine  Progress Note    Patient Name: Memo Lew  MRN: [id number]  Patient Class: OP- Observation   Admission Date: 7/30/2023  Length of Stay: 0 days  Attending Physician: Cindi Carter MD  Primary Care Provider: SAJAN Santana        Subjective:     Principal Problem:Acute cholecystitis        HPI:  The patient is a 71 yo male with CAD s/p CABG 2018, Bilateral carotid stenosis s/p left endarterectomy 2016, Sarcoidosis, Crohn's disease -on Mesalamine, PAF- on Eliquis, S/p PPM, PAD s/p LLE PCI, Gout, Peripheral neuropathy, HTN who presented to ED with RUQ pain x 2 weeks that progressively worsened. The patient reports he has RUQ- worse after eating. He went to [facility] ED for the pain one week ago and was told he has sludge in the gallbladder and to f/u with general surgery. Since then the pain waxed and waned. 2 days ago, he ate BBQ ribs. He noted the pain acutely worsened after that, so he came to ED for evaluation. Pt reports mild fever of 99F, no chills, no N/V.     In the ED, Afebrile, BP low normal, WBC normal. Alk phos 288, AST 89, ALT 71, normal Lipase and T.Bili. Hep C negative. ABD u/s showed gallbladder wall thickening and sludge. + Gilliam sign, No stones, CBD upper limits of normal.   ED provider discussed care with Dr. Rojas with general surgery who recommended to hold Eliquis, IV Abx, IVFs, NPO, will likely need MRCP tomorrow morning.     The patient will be placed in observation under hospital medicine       Overview/Hospital Course:  Pt unable to obtain MRI/MRCP due to PPM. Gen surg tentatively planning intraop cholangiogram and lap leigh 08/01. Continued on IV Zosyn       Interval History: NAEON. PT continues to have R sided abd pain, worse with movement, improved at rest. Denies CP, SOB, n/v. Is NPO for planned surgical intervention today     Review of Systems  Objective:     Vital Signs (Most Recent):  Temp: 97.2 °F (36.2 °C) (08/01/23 0809)  Pulse: 99 (08/01/23  0845)  Resp: 18 (08/01/23 0845)  BP: (!) 149/77 (08/01/23 0809)  SpO2: 96 % (08/01/23 0845) Vital Signs (24h Range):  Temp:  [97.2 °F (36.2 °C)-98.3 °F (36.8 °C)] 97.2 °F (36.2 °C)  Pulse:  [71-99] 99  Resp:  [16-18] 18  SpO2:  [91 %-96 %] 96 %  BP: (122-155)/(56-98) 149/77     Weight: 95.3 kg (210 lb)  Body mass index is 28.88 kg/m².    Intake/Output Summary (Last 24 hours) at 8/1/2023 0934  Last data filed at 7/31/2023 1812  Gross per 24 hour   Intake 394.8 ml   Output --   Net 394.8 ml         Physical Exam  Vitals and nursing note reviewed.   Constitutional:       General: He is not in acute distress.     Appearance: Normal appearance. He is not ill-appearing.   Cardiovascular:      Rate and Rhythm: Normal rate. Rhythm irregular.      Heart sounds: No murmur heard.     No friction rub. No gallop.   Pulmonary:      Effort: Pulmonary effort is normal.      Breath sounds: Normal breath sounds. No wheezing, rhonchi or rales.   Abdominal:      General: Bowel sounds are normal. There is no distension.      Palpations: Abdomen is soft.      Tenderness: There is abdominal tenderness (RUQ TTP). There is no guarding or rebound.   Musculoskeletal:      Right lower leg: No edema.      Left lower leg: No edema.   Neurological:      Mental Status: He is alert and oriented to person, place, and time. Mental status is at baseline.             Significant Labs: All pertinent labs within the past 24 hours have been reviewed.    Significant Imaging: I have reviewed all pertinent imaging results/findings within the past 24 hours.      Assessment/Plan:      * Acute cholecystitis  ABD u/s showed gallbladder wall thickening and sludge. + Gilliam sign, No stones, CBD upper limits of normal.    Pt has an implanted PPM- likely will not be able to have MRCP  Gen Surg following - tentative plan for intraop cholangiogram and lap leigh 08/01  Eliquis held pending surgical intervention   Continue IV Zosyn, gentle IV fluids   NPO pending surg  interventiofn       Elevated LFTs  Improving, Likely 2/2 to above   Hep C negative   Monitor LFTs       Pacemaker        PAD (peripheral artery disease)  S/p LLE PCI  Stable   Cont ASA, Statin       Carotid artery disease  S/p Left endarterectomy 2016  Cont ASA, Statin       Gout  Cont Allopurinol       Sarcoidosis of lung  followed closely by Rheumatology   sats stable on room air at this time   Pt is not currently on any disease modifying mediations       Crohn disease  Hold mesalamine for now pending surgical intervention       Essential hypertension  Cont Coreg   Hold Benazepril, Amlodipine, Lasix for now periop, resume lasix postop pending trends   Monitor       CAD (coronary artery disease)  S/p CABG 2018  Cont ASA, Coreg, Statin   Hold benazepril periop     PAF (paroxysmal atrial fibrillation)  Patient with Paroxysmal (<7 days) atrial fibrillation which is controlled currently with Amiodarone and Coreg. Patient is currently in sinus rhythm (PACED) .[id number] Score: 2.  Anticoagulation indicated. Anticoagulation done with Eliquis .  Hx PPM   Eliquis held pending surg intervention, will resume postop   Continue home Amio and Coreg        VTE Risk Mitigation (From admission, onward)         Ordered     IP VTE HIGH RISK PATIENT  Once         07/31/23 0028     Place sequential compression device  Until discontinued         07/31/23 0028     Reason for No Pharmacological VTE Prophylaxis  Once        Question:  Reasons:  Answer:  Risk of Bleeding    07/31/23 0028                Discharge Planning   SULMA: 8/1/2023     Code Status: Full Code   Is the patient medically ready for discharge?: No    Reason for patient still in hospital (select all that apply): Patient trending condition and Treatment  Discharge Plan A: Home with family                  Cindi Carter MD  Department of Hospital Medicine   [facility] - Med Surg

## 2023-08-01 NOTE — ASSESSMENT & PLAN NOTE
ABD u/s showed gallbladder wall thickening and sludge. + Gilliam sign, No stones, CBD upper limits of normal.    Pt has an implanted PPM- likely will not be able to have MRCP  Gen Surg following - tentative plan for intraop cholangiogram and lap leigh 08/01  Eliquis held pending surgical intervention   Continue IV Zosyn, gentle IV fluids   NPO pending surg interventiofn

## 2023-08-02 VITALS
BODY MASS INDEX: 27.92 KG/M2 | SYSTOLIC BLOOD PRESSURE: 133 MMHG | HEIGHT: 72 IN | HEART RATE: 99 BPM | RESPIRATION RATE: 18 BRPM | WEIGHT: 206.13 LBS | DIASTOLIC BLOOD PRESSURE: 79 MMHG | TEMPERATURE: 98 F | OXYGEN SATURATION: 94 %

## 2023-08-02 LAB
ALBUMIN SERPL BCP-MCNC: 3.1 G/DL (ref 3.5–5.2)
ALP SERPL-CCNC: 199 U/L (ref 55–135)
ALT SERPL W/O P-5'-P-CCNC: 34 U/L (ref 10–44)
ANION GAP SERPL CALC-SCNC: 7 MMOL/L (ref 8–16)
AST SERPL-CCNC: 19 U/L (ref 10–40)
BASOPHILS # BLD AUTO: 0.01 K/UL (ref 0–0.2)
BASOPHILS NFR BLD: 0.1 % (ref 0–1.9)
BILIRUB SERPL-MCNC: 0.6 MG/DL (ref 0.1–1)
BUN SERPL-MCNC: 14 MG/DL (ref 8–23)
CALCIUM SERPL-MCNC: 9.6 MG/DL (ref 8.7–10.5)
CHLORIDE SERPL-SCNC: 104 MMOL/L (ref 95–110)
CO2 SERPL-SCNC: 28 MMOL/L (ref 23–29)
CREAT SERPL-MCNC: 1.1 MG/DL (ref 0.5–1.4)
DIFFERENTIAL METHOD: ABNORMAL
EOSINOPHIL # BLD AUTO: 0 K/UL (ref 0–0.5)
EOSINOPHIL NFR BLD: 0 % (ref 0–8)
ERYTHROCYTE [DISTWIDTH] IN BLOOD BY AUTOMATED COUNT: 13 % (ref 11.5–14.5)
EST. GFR  (NO RACE VARIABLE): >60 ML/MIN/1.73 M^2
GLUCOSE SERPL-MCNC: 205 MG/DL (ref 70–110)
HCT VFR BLD AUTO: 38.2 % (ref 40–54)
HGB BLD-MCNC: 12 G/DL (ref 14–18)
IMM GRANULOCYTES # BLD AUTO: 0.03 K/UL (ref 0–0.04)
IMM GRANULOCYTES NFR BLD AUTO: 0.4 % (ref 0–0.5)
LYMPHOCYTES # BLD AUTO: 0.8 K/UL (ref 1–4.8)
LYMPHOCYTES NFR BLD: 9.5 % (ref 18–48)
MAGNESIUM SERPL-MCNC: 2 MG/DL (ref 1.6–2.6)
MCH RBC QN AUTO: 28.9 PG (ref 27–31)
MCHC RBC AUTO-ENTMCNC: 31.4 G/DL (ref 32–36)
MCV RBC AUTO: 92 FL (ref 82–98)
MONOCYTES # BLD AUTO: 0.3 K/UL (ref 0.3–1)
MONOCYTES NFR BLD: 3.1 % (ref 4–15)
NEUTROPHILS # BLD AUTO: 6.9 K/UL (ref 1.8–7.7)
NEUTROPHILS NFR BLD: 86.9 % (ref 38–73)
NRBC BLD-RTO: 0 /100 WBC
PHOSPHATE SERPL-MCNC: 2.6 MG/DL (ref 2.7–4.5)
PLATELET # BLD AUTO: 278 K/UL (ref 150–450)
PMV BLD AUTO: 10.8 FL (ref 9.2–12.9)
POTASSIUM SERPL-SCNC: 4.3 MMOL/L (ref 3.5–5.1)
PROT SERPL-MCNC: 6.7 G/DL (ref 6–8.4)
RBC # BLD AUTO: 4.15 M/UL (ref 4.6–6.2)
SODIUM SERPL-SCNC: 139 MMOL/L (ref 136–145)
WBC # BLD AUTO: 7.96 K/UL (ref 3.9–12.7)

## 2023-08-02 PROCEDURE — 83735 ASSAY OF MAGNESIUM: CPT | Performed by: SURGERY

## 2023-08-02 PROCEDURE — 63600175 PHARM REV CODE 636 W HCPCS: Performed by: SURGERY

## 2023-08-02 PROCEDURE — 25000003 PHARM REV CODE 250: Performed by: SURGERY

## 2023-08-02 PROCEDURE — 84100 ASSAY OF PHOSPHORUS: CPT | Performed by: SURGERY

## 2023-08-02 PROCEDURE — 25000003 PHARM REV CODE 250: Performed by: INTERNAL MEDICINE

## 2023-08-02 PROCEDURE — 80053 COMPREHEN METABOLIC PANEL: CPT | Performed by: SURGERY

## 2023-08-02 PROCEDURE — 36415 COLL VENOUS BLD VENIPUNCTURE: CPT | Performed by: SURGERY

## 2023-08-02 PROCEDURE — G0378 HOSPITAL OBSERVATION PER HR: HCPCS

## 2023-08-02 PROCEDURE — 85025 COMPLETE CBC W/AUTO DIFF WBC: CPT | Performed by: SURGERY

## 2023-08-02 PROCEDURE — 94761 N-INVAS EAR/PLS OXIMETRY MLT: CPT

## 2023-08-02 RX ORDER — TRAMADOL HYDROCHLORIDE 50 MG/1
50 TABLET ORAL EVERY 8 HOURS PRN
Qty: 12 TABLET | Refills: 0 | Status: SHIPPED | OUTPATIENT
Start: 2023-08-02 | End: 2023-08-06

## 2023-08-02 RX ORDER — AMOXICILLIN 250 MG
1 CAPSULE ORAL DAILY
Qty: 30 TABLET | Refills: 0 | Status: SHIPPED | OUTPATIENT
Start: 2023-08-02

## 2023-08-02 RX ORDER — POLYETHYLENE GLYCOL 3350 17 G/17G
17 POWDER, FOR SOLUTION ORAL DAILY PRN
Qty: 14 EACH | Refills: 0
Start: 2023-08-02 | End: 2023-08-16

## 2023-08-02 RX ORDER — EZETIMIBE 10 MG/1
10 TABLET ORAL DAILY
Status: DISCONTINUED | OUTPATIENT
Start: 2023-08-02 | End: 2023-08-02 | Stop reason: HOSPADM

## 2023-08-02 RX ORDER — LISINOPRIL 20 MG/1
20 TABLET ORAL DAILY
Status: DISCONTINUED | OUTPATIENT
Start: 2023-08-02 | End: 2023-08-02 | Stop reason: HOSPADM

## 2023-08-02 RX ORDER — FUROSEMIDE 40 MG/1
40 TABLET ORAL 2 TIMES DAILY
Status: DISCONTINUED | OUTPATIENT
Start: 2023-08-02 | End: 2023-08-02 | Stop reason: HOSPADM

## 2023-08-02 RX ORDER — AMLODIPINE BESYLATE 2.5 MG/1
2.5 TABLET ORAL DAILY
Status: DISCONTINUED | OUTPATIENT
Start: 2023-08-02 | End: 2023-08-02 | Stop reason: HOSPADM

## 2023-08-02 RX ADMIN — ALLOPURINOL 300 MG: 300 TABLET ORAL at 06:08

## 2023-08-02 RX ADMIN — AMIODARONE HYDROCHLORIDE 200 MG: 200 TABLET ORAL at 08:08

## 2023-08-02 RX ADMIN — ASPIRIN 81 MG: 81 TABLET, COATED ORAL at 08:08

## 2023-08-02 RX ADMIN — FOLIC ACID 1 MG: 1 TABLET ORAL at 08:08

## 2023-08-02 RX ADMIN — APIXABAN 5 MG: 2.5 TABLET, FILM COATED ORAL at 08:08

## 2023-08-02 RX ADMIN — FUROSEMIDE 40 MG: 40 TABLET ORAL at 08:08

## 2023-08-02 RX ADMIN — GABAPENTIN 800 MG: 400 CAPSULE ORAL at 03:08

## 2023-08-02 RX ADMIN — CARVEDILOL 25 MG: 12.5 TABLET, FILM COATED ORAL at 08:08

## 2023-08-02 RX ADMIN — LISINOPRIL 20 MG: 20 TABLET ORAL at 08:08

## 2023-08-02 RX ADMIN — PANTOPRAZOLE SODIUM 40 MG: 40 TABLET, DELAYED RELEASE ORAL at 08:08

## 2023-08-02 RX ADMIN — PIPERACILLIN SODIUM AND TAZOBACTAM SODIUM 4.5 G: 4; .5 INJECTION, POWDER, FOR SOLUTION INTRAVENOUS at 10:08

## 2023-08-02 RX ADMIN — EZETIMIBE 10 MG: 10 TABLET ORAL at 08:08

## 2023-08-02 RX ADMIN — AMLODIPINE BESYLATE 2.5 MG: 2.5 TABLET ORAL at 08:08

## 2023-08-02 RX ADMIN — PIPERACILLIN SODIUM AND TAZOBACTAM SODIUM 4.5 G: 4; .5 INJECTION, POWDER, FOR SOLUTION INTRAVENOUS at 01:08

## 2023-08-02 RX ADMIN — GABAPENTIN 800 MG: 400 CAPSULE ORAL at 08:08

## 2023-08-02 NOTE — SUBJECTIVE & OBJECTIVE
Interval History:  Pain well controlled, NPO for cholecystectomy today    Medications:  Continuous Infusions:  Scheduled Meds:   allopurinoL  300 mg Oral QAM    amiodarone  200 mg Oral Daily    amLODIPine  2.5 mg Oral Daily    apixaban  5 mg Oral BID    aspirin  81 mg Oral Daily    carvediloL  25 mg Oral BID WM    ezetimibe  10 mg Oral Daily    folic acid  1 mg Oral Daily    furosemide  40 mg Oral BID    gabapentin  800 mg Oral TID    lisinopriL  20 mg Oral Daily    pantoprazole  40 mg Oral Daily    piperacillin-tazobactam (Zosyn) IV (PEDS and ADULTS) (extended infusion is not appropriate)  4.5 g Intravenous Q8H     PRN Meds:acetaminophen, albuterol-ipratropium, dextrose 10%, dextrose 10%, diphenhydrAMINE, glucagon (human recombinant), glucose, glucose, HYDROcodone-acetaminophen, melatonin, morphine, naloxone, ondansetron, prochlorperazine, sodium chloride 0.9%     Review of patient's allergies indicates:   Allergen Reactions    Infliximab Anaphylaxis    Iodine and iodide containing products Rash     Patient states severe rash similar to a sunburn experienced after CT contrast. Patient with prior history of pre-meds before any contrast studies.      Mercaptopurine analogues (thiopurines)      Objective:     Vital Signs (Most Recent):  Temp: 98.1 °F (36.7 °C) (08/02/23 0721)  Pulse: 75 (08/02/23 0744)  Resp: 16 (08/02/23 0744)  BP: (!) 167/84 (08/02/23 0721)  SpO2: 96 % (08/02/23 0744) Vital Signs (24h Range):  Temp:  [97.2 °F (36.2 °C)-98.9 °F (37.2 °C)] 98.1 °F (36.7 °C)  Pulse:  [] 75  Resp:  [14-24] 16  SpO2:  [90 %-99 %] 96 %  BP: (125-167)/(68-92) 167/84     Weight: 93.5 kg (206 lb 2.1 oz)  Body mass index is 28.35 kg/m².    Intake/Output - Last 3 Shifts         07/31 0700  08/01 0659 08/01 0700  08/02 0659 08/02 0700  08/03 0659    P.O. 50 120     I.V. (mL/kg) 891.6 (9.4) 1743 (18.6)     IV Piggyback 106.8 1489.3     Total Intake(mL/kg) 1048.4 (11) 3352.3 (35.9)     Blood  15     Total Output  15     Net  +1048.4 +3337.3            Urine Occurrence 3 x 4 x              Physical Exam  Vitals reviewed.   Constitutional:       Appearance: He is well-developed.   HENT:      Head: Normocephalic and atraumatic.   Cardiovascular:      Rate and Rhythm: Normal rate and regular rhythm.   Pulmonary:      Effort: Pulmonary effort is normal.      Breath sounds: Normal breath sounds.   Abdominal:      General: Bowel sounds are normal. There is no distension.      Palpations: Abdomen is soft.      Tenderness: There is abdominal tenderness (ruq).   Musculoskeletal:      Cervical back: Normal range of motion and neck supple.   Skin:     General: Skin is warm and dry.   Neurological:      Mental Status: He is alert and oriented to person, place, and time.          Significant Labs:  I have reviewed all pertinent lab results within the past 24 hours.  CBC:   Recent Labs   Lab 08/02/23  0628   WBC 7.96   RBC 4.15*   HGB 12.0*   HCT 38.2*      MCV 92   MCH 28.9   MCHC 31.4*     CMP:   Recent Labs   Lab 08/02/23  0628   *   CALCIUM 9.6   ALBUMIN 3.1*   PROT 6.7      K 4.3   CO2 28      BUN 14   CREATININE 1.1   ALKPHOS 199*   ALT 34   AST 19   BILITOT 0.6       Significant Diagnostics:

## 2023-08-02 NOTE — ASSESSMENT & PLAN NOTE
Status post robotic cholecystectomy     Regular diet  Pain control  Ambulate  Okay to discharge   Follow-up in clinic in 2 weeks for incision check

## 2023-08-02 NOTE — ASSESSMENT & PLAN NOTE
Cholecystectomy today  NPO, IV fluids  IV antibiotics  Hold anticoagulation for surgery   The risks of robotic/laparoscopic cholecystectomy including bleeding, infection, common bile duct injury, bile leak, injury to abdominal organs, failure to alleviate symptoms, pulmonary embolus, deep vein thrombosis, cardiac event, and possibility of conversion to an open operation were explained to the patient.   The nature of the patient's condition, probability of success, risks of refusing treatment, and alternatives and risks of the alternatives were also explained.  The patient verbalized understanding.

## 2023-08-02 NOTE — ASSESSMENT & PLAN NOTE
Improving  If patient's LFTs were to elevate again would need evaluation with GI for possible endoscopic ultrasound or ERCP as he was noted to have a mildly dilated/prominent common bile duct on imaging

## 2023-08-02 NOTE — PLAN OF CARE
[facility] - Med Surg  Discharge Final Note    Primary Care Provider: SAJAN Santana    Expected Discharge Date: 8/2/2023    Final Discharge Note (most recent)       Final Note - 08/02/23 0920          Final Note    Assessment Type Final Discharge Note     Anticipated Discharge Disposition Home or Self Care     Hospital Resources/Appts/Education Provided Appointments scheduled and added to AVS                                Contact Info       SAJAN Santana   Specialty: Family Medicine   Relationship: PCP - General    [address] [place] [address] [address]  [address]  [facility] [address]   Phone: [phone]       Next Steps: Schedule an appointment as soon as possible for a visit in 3 day(s)    Pasquale Wright MD   Specialty: Gastroenterology    [address] [facility] [address]   Phone: [phone]       Next Steps: Schedule an appointment as soon as possible for a visit in 1 week(s)    Instructions: will need liver function labs rechecked    Kyler Rojas MD   Specialty: General Surgery, Bariatrics    [address] [facility]  [address]th [address]  [facility] [address]   Phone: [phone]       Next Steps: Schedule an appointment as soon as possible for a visit in 2 week(s)    Instructions: For wound re-check

## 2023-08-02 NOTE — PROGRESS NOTES
[facility]  General Surgery  Progress Note    Subjective:     History of Present Illness:  72-year-old male referred for acute cholecystitis.  Patient reports right upper quadrant abdominal pain which has been going on for few weeks.  He was recently seen at outside hospital noted to have gallstones and dilated common bile duct was supposed to be scheduled for MRCP which he never under went.  He reports the pain has progressively worsened causing him to come to the ER.      Post-Op Info:  Procedure(s) (LRB):  XI ROBOTIC CHOLECYSTECTOMY (N/A)  LYSIS, ADHESIONS, LAPAROSCOPIC (N/A)   1 Day Post-Op     Interval History:  Pain well controlled, NPO for cholecystectomy today    Medications:  Continuous Infusions:  Scheduled Meds:   allopurinoL  300 mg Oral QAM    amiodarone  200 mg Oral Daily    amLODIPine  2.5 mg Oral Daily    apixaban  5 mg Oral BID    aspirin  81 mg Oral Daily    carvediloL  25 mg Oral BID WM    ezetimibe  10 mg Oral Daily    folic acid  1 mg Oral Daily    furosemide  40 mg Oral BID    gabapentin  800 mg Oral TID    lisinopriL  20 mg Oral Daily    pantoprazole  40 mg Oral Daily    piperacillin-tazobactam (Zosyn) IV (PEDS and ADULTS) (extended infusion is not appropriate)  4.5 g Intravenous Q8H     PRN Meds:acetaminophen, albuterol-ipratropium, dextrose 10%, dextrose 10%, diphenhydrAMINE, glucagon (human recombinant), glucose, glucose, HYDROcodone-acetaminophen, melatonin, morphine, naloxone, ondansetron, prochlorperazine, sodium chloride 0.9%     Review of patient's allergies indicates:   Allergen Reactions    Infliximab Anaphylaxis    Iodine and iodide containing products Rash     Patient states severe rash similar to a sunburn experienced after CT contrast. Patient with prior history of pre-meds before any contrast studies.      Mercaptopurine analogues (thiopurines)      Objective:     Vital Signs (Most Recent):  Temp: 98.1 °F (36.7 °C) (08/02/23 0721)  Pulse: 75 (08/02/23  0744)  Resp: 16 (08/02/23 0744)  BP: (!) 167/84 (08/02/23 0721)  SpO2: 96 % (08/02/23 0744) Vital Signs (24h Range):  Temp:  [97.2 °F (36.2 °C)-98.9 °F (37.2 °C)] 98.1 °F (36.7 °C)  Pulse:  [] 75  Resp:  [14-24] 16  SpO2:  [90 %-99 %] 96 %  BP: (125-167)/(68-92) 167/84     Weight: 93.5 kg (206 lb 2.1 oz)  Body mass index is 28.35 kg/m².    Intake/Output - Last 3 Shifts         07/31 0700 08/01 0659 08/01 0700 08/02 0659 08/02 0700 08/03 0659    P.O. 50 120     I.V. (mL/kg) 891.6 (9.4) 1743 (18.6)     IV Piggyback 106.8 1489.3     Total Intake(mL/kg) 1048.4 (11) 3352.3 (35.9)     Blood  15     Total Output  15     Net +1048.4 +3337.3            Urine Occurrence 3 x 4 x              Physical Exam  Vitals reviewed.   Constitutional:       Appearance: He is well-developed.   HENT:      Head: Normocephalic and atraumatic.   Cardiovascular:      Rate and Rhythm: Normal rate and regular rhythm.   Pulmonary:      Effort: Pulmonary effort is normal.      Breath sounds: Normal breath sounds.   Abdominal:      General: Bowel sounds are normal. There is no distension.      Palpations: Abdomen is soft.      Tenderness: There is abdominal tenderness (ruq).   Musculoskeletal:      Cervical back: Normal range of motion and neck supple.   Skin:     General: Skin is warm and dry.   Neurological:      Mental Status: He is alert and oriented to person, place, and time.          Significant Labs:  I have reviewed all pertinent lab results within the past 24 hours.  CBC:   Recent Labs   Lab 08/02/23 0628   WBC 7.96   RBC 4.15*   HGB 12.0*   HCT 38.2*      MCV 92   MCH 28.9   MCHC 31.4*     CMP:   Recent Labs   Lab 08/02/23 0628   *   CALCIUM 9.6   ALBUMIN 3.1*   PROT 6.7      K 4.3   CO2 28      BUN 14   CREATININE 1.1   ALKPHOS 199*   ALT 34   AST 19   BILITOT 0.6       Significant Diagnostics:      Assessment/Plan:     * Acute cholecystitis  Cholecystectomy today  NPO, IV fluids  IV  antibiotics  Hold anticoagulation for surgery   The risks of robotic/laparoscopic cholecystectomy including bleeding, infection, common bile duct injury, bile leak, injury to abdominal organs, failure to alleviate symptoms, pulmonary embolus, deep vein thrombosis, cardiac event, and possibility of conversion to an open operation were explained to the patient.   The nature of the patient's condition, probability of success, risks of refusing treatment, and alternatives and risks of the alternatives were also explained.  The patient verbalized understanding.      Crohn disease  Prior colon resection  Previously on Humira  Asymptomatic    Elevated LFTs  Patient unable to undergo MRCP due to pacemaker and has contrast allergy  Could require EUS or ERCP if LFTs remain elevated post surgery    PAF (paroxysmal atrial fibrillation)  Will need to hold Eliquis for surgery tentatively tomorrow  Medical management        Kyler Rojas MD  General Surgery  [facility] - Med Surg

## 2023-08-02 NOTE — PROGRESS NOTES
[facility]  General Surgery  Progress Note    Subjective:     History of Present Illness:  72-year-old male referred for acute cholecystitis.  Patient reports right upper quadrant abdominal pain which has been going on for few weeks.  He was recently seen at outside hospital noted to have gallstones and dilated common bile duct was supposed to be scheduled for MRCP which he never under went.  He reports the pain has progressively worsened causing him to come to the ER.      Post-Op Info:  Procedure(s) (LRB):  XI ROBOTIC CHOLECYSTECTOMY (N/A)  LYSIS, ADHESIONS, LAPAROSCOPIC (N/A)   1 Day Post-Op     Interval History:  Status post robotic cholecystectomy, pain well controlled, tolerating diet    Medications:  Continuous Infusions:  Scheduled Meds:   allopurinoL  300 mg Oral QAM    amiodarone  200 mg Oral Daily    amLODIPine  2.5 mg Oral Daily    apixaban  5 mg Oral BID    aspirin  81 mg Oral Daily    carvediloL  25 mg Oral BID WM    ezetimibe  10 mg Oral Daily    folic acid  1 mg Oral Daily    furosemide  40 mg Oral BID    gabapentin  800 mg Oral TID    lisinopriL  20 mg Oral Daily    pantoprazole  40 mg Oral Daily    piperacillin-tazobactam (Zosyn) IV (PEDS and ADULTS) (extended infusion is not appropriate)  4.5 g Intravenous Q8H     PRN Meds:acetaminophen, albuterol-ipratropium, dextrose 10%, dextrose 10%, diphenhydrAMINE, glucagon (human recombinant), glucose, glucose, HYDROcodone-acetaminophen, melatonin, morphine, naloxone, ondansetron, prochlorperazine, sodium chloride 0.9%     Review of patient's allergies indicates:   Allergen Reactions    Infliximab Anaphylaxis    Iodine and iodide containing products Rash     Patient states severe rash similar to a sunburn experienced after CT contrast. Patient with prior history of pre-meds before any contrast studies.      Mercaptopurine analogues (thiopurines)      Objective:     Vital Signs (Most Recent):  Temp: 98.1 °F (36.7 °C) (08/02/23  0721)  Pulse: 75 (08/02/23 0744)  Resp: 16 (08/02/23 0744)  BP: (!) 167/84 (08/02/23 0721)  SpO2: 96 % (08/02/23 0744) Vital Signs (24h Range):  Temp:  [97.2 °F (36.2 °C)-98.9 °F (37.2 °C)] 98.1 °F (36.7 °C)  Pulse:  [] 75  Resp:  [14-24] 16  SpO2:  [90 %-99 %] 96 %  BP: (125-167)/(68-92) 167/84     Weight: 93.5 kg (206 lb 2.1 oz)  Body mass index is 28.35 kg/m².    Intake/Output - Last 3 Shifts         07/31 0700  08/01 0659 08/01 0700 08/02 0659 08/02 0700 08/03 0659    P.O. 50 120     I.V. (mL/kg) 891.6 (9.4) 1743 (18.6)     IV Piggyback 106.8 1489.3     Total Intake(mL/kg) 1048.4 (11) 3352.3 (35.9)     Blood  15     Total Output  15     Net +1048.4 +3337.3            Urine Occurrence 3 x 4 x             Physical Exam  Vitals reviewed.   Constitutional:       Appearance: He is well-developed.   HENT:      Head: Normocephalic and atraumatic.   Cardiovascular:      Rate and Rhythm: Normal rate and regular rhythm.   Pulmonary:      Effort: Pulmonary effort is normal.      Breath sounds: Normal breath sounds.   Abdominal:      General: Bowel sounds are normal. There is no distension.      Palpations: Abdomen is soft.      Tenderness: There is no abdominal tenderness.      Comments: Incisions clean dry and intact   Musculoskeletal:      Cervical back: Normal range of motion and neck supple.   Skin:     General: Skin is warm and dry.   Neurological:      Mental Status: He is alert and oriented to person, place, and time.          Significant Labs:  I have reviewed all pertinent lab results within the past 24 hours.  CBC:   Recent Labs   Lab 08/02/23 0628   WBC 7.96   RBC 4.15*   HGB 12.0*   HCT 38.2*      MCV 92   MCH 28.9   MCHC 31.4*       CMP:   Recent Labs   Lab 08/02/23  0628   *   CALCIUM 9.6   ALBUMIN 3.1*   PROT 6.7      K 4.3   CO2 28      BUN 14   CREATININE 1.1   ALKPHOS 199*   ALT 34   AST 19   BILITOT 0.6         Significant Diagnostics:      Assessment/Plan:     * Acute  cholecystitis  Status post robotic cholecystectomy     Regular diet  Pain control  Ambulate  Okay to discharge   Follow-up in clinic in 2 weeks for incision check    Crohn disease  Prior colon resection  Previously on Humira  Asymptomatic    Elevated LFTs  Improving  If patient's LFTs were to elevate again would need evaluation with GI for possible endoscopic ultrasound or ERCP as he was noted to have a mildly dilated/prominent common bile duct on imaging    PAF (paroxysmal atrial fibrillation)  Okay to restart Christal Rojas MD  General Surgery  [facility] - Med Surg

## 2023-08-02 NOTE — DISCHARGE SUMMARY
[facility] Medicine  Discharge Summary      Patient Name: Memo Lew  MRN: [id number]  KARIN: [id number]  Patient Class: IP- Inpatient  Admission Date: 7/30/2023  Hospital Length of Stay: 3 days  Discharge Date and Time: 8/2/2023  4:34 PM  Attending Physician: No att. providers found   Discharging Provider: Cindi Carter MD  Primary Care Provider: SAJAN Santana    Primary Care Team: Networked reference to record PCT     HPI:   The patient is a 71 yo male with CAD s/p CABG 2018, Bilateral carotid stenosis s/p left endarterectomy 2016, Sarcoidosis, Crohn's disease -on Mesalamine, PAF- on Eliquis, S/p PPM, PAD s/p LLE PCI, Gout, Peripheral neuropathy, HTN who presented to ED with RUQ pain x 2 weeks that progressively worsened. The patient reports he has RUQ- worse after eating. He went to [facility] ED for the pain one week ago and was told he has sludge in the gallbladder and to f/u with general surgery. Since then the pain waxed and waned. 2 days ago, he ate BBQ ribs. He noted the pain acutely worsened after that, so he came to ED for evaluation. Pt reports mild fever of 99F, no chills, no N/V.     In the ED, Afebrile, BP low normal, WBC normal. Alk phos 288, AST 89, ALT 71, normal Lipase and T.Bili. Hep C negative. ABD u/s showed gallbladder wall thickening and sludge. + Gilliam sign, No stones, CBD upper limits of normal.   ED provider discussed care with Dr. Rojas with general surgery who recommended to hold Eliquis, IV Abx, IVFs, NPO, will likely need MRCP tomorrow morning.     The patient will be placed in observation under hospital medicine       Procedure(s) (LRB):  XI ROBOTIC CHOLECYSTECTOMY (N/A)  LYSIS, ADHESIONS, LAPAROSCOPIC (N/A)      Hospital Course:   Pt unable to obtain MRI/MRCP due to PPM. He was continued on IV Zosyn. Underwent laparascopic cholecystectomy on 8/01 with Gen Surg Dr. Rojas. He tolerated procedure well, LFTs normalized on POD1. He was able to tolerate diet without  nausea, vomiting. + flatus. Discussed with Gen Surg Dr. Rojas, pt deemed stable for discharge from Gen Surg standpoint but if patient were to have elevation in LFTs, will require GI evaluation for endoscopic ultrasound as he had mildly dilated common bile duct on imaging.  Pt reports that he sees GI Dr. Wright next week. He was advised to have LFTs checked with GI on follow up to determine need for EUS.     PT will follow up with PCP within 3-5 days, GI within 1 week and Gen Surg in 2 weeks for incision check. Pt seen and examined on day of discharge. Return precautions and followup instructions discussed at length, pt expressed understanding, all questions answered. Pt appears stable for discharge home today per my exam.         Goals of Care Treatment Preferences:  Code Status: Full Code      Consults:   Consults (From admission, onward)        Status Ordering Provider     Inpatient consult to General surgery  Once        Provider:  Kyler Rojas MD    Completed NOE MENA JR          No new Assessment & Plan notes have been filed under this hospital service since the last note was generated.  Service: Hospital Medicine    Final Active Diagnoses:    Diagnosis Date Noted POA    PRINCIPAL PROBLEM:  Acute cholecystitis [K81.0] 07/31/2023 Yes    Elevated LFTs [R79.89] 07/31/2023 Yes    Crohn disease [K50.90] 07/31/2023 Yes    Sarcoidosis of lung [D86.0] 07/31/2023 Yes    Gout [M10.9] 07/31/2023 Yes    Carotid artery disease [I77.9] 07/31/2023 Yes    PAD (peripheral artery disease) [I73.9] 07/31/2023 Yes    Pacemaker [Z95.0] 07/31/2023 Yes    PAF (paroxysmal atrial fibrillation) [I48.0] 07/27/2019 Yes    CAD (coronary artery disease) [I25.10] 07/27/2019 Yes    Essential hypertension [I10] 07/27/2019 Yes      Problems Resolved During this Admission:       Discharged Condition: good    Disposition: Home or Self Care    Follow Up:   Follow-up Information     SAJAN Santana. Schedule an appointment as  soon as possible for a visit in 3 day(s).    Specialty: Family Medicine  Contact information:  [address]  [address]  [facility] [address]  [phone]             Pasquale Wright MD. Schedule an appointment as soon as possible for a visit in 1 week(s).    Specialty: Gastroenterology  Why: will need liver function labs rechecked  Contact information:  [address]  [facility] [address]  [phone]             Kyler Rojas MD. Schedule an appointment as soon as possible for a visit in 2 week(s).    Specialties: General Surgery, Bariatrics  Why: For wound re-check  Contact information:  [address]  [address]  [facility] [address]  [phone]                       Patient Instructions:      Notify your health care provider if you experience any of the following:  persistent nausea and vomiting or diarrhea     Notify your health care provider if you experience any of the following:  severe uncontrolled pain     Notify your health care provider if you experience any of the following:  redness, tenderness, or signs of infection (pain, swelling, redness, odor or green/yellow discharge around incision site)     Activity as tolerated       Significant Diagnostic Studies: See Hospital Course     Pending Diagnostic Studies:     Procedure Component Value Units Date/Time    Specimen to Pathology, Surgery General Surgery [[id number]] Collected: 08/01/23 1434    Order Status: Sent Lab Status: In process Updated: 08/02/23 0922    Specimen: Tissue          Medications:  Reconciled Home Medications:      Medication List      START taking these medications    polyethylene glycol 17 gram Pwpk  Commonly known as: GLYCOLAX  Take 17 g by mouth daily as needed (constipation).     STOOL SOFTENER-LAXATIVE 8.6-50 mg per tablet  Generic drug: senna-docusate 8.6-50 mg  Take 1 tablet by mouth once daily.     traMADoL 50 mg tablet  Commonly known as: ULTRAM  Take 1 tablet (50 mg total) by mouth every 8 (eight) hours  as needed for Pain.        CONTINUE taking these medications    allopurinoL 300 MG tablet  Commonly known as: ZYLOPRIM  Take 300 mg by mouth every morning.     amiodarone 200 MG Tab  Commonly known as: PACERONE  Take by mouth once daily.     amLODIPine 2.5 MG tablet  Commonly known as: NORVASC  Take 2.5 mg by mouth.     apixaban 5 mg Tab  Commonly known as: ELIQUIS  Take 1 tablet (5 mg total) by mouth 2 (two) times daily.     aspirin 81 MG EC tablet  Commonly known as: ECOTRIN  Take 81 mg by mouth once daily.     AVAR LS 10-2 % Foam  Generic drug: sulfacetamide sodium-sulfur  APPLY ONE GRAM ON THE SKIN DAILY     benazepriL 20 MG tablet  Commonly known as: LOTENSIN  Take 20 mg by mouth 2 (two) times daily.     carvediloL 25 MG tablet  Commonly known as: COREG  Take 25 mg by mouth 2 (two) times daily with meals.     coenzyme Q10 100 mg Chew  Take 100 mg by mouth once daily.     colchicine 0.6 mg tablet  Commonly known as: COLCRYS  Take 0.6 mg by mouth once daily.     ezetimibe 10 mg tablet  Commonly known as: ZETIA  Take 10 mg by mouth once daily.     fluticasone propionate 50 mcg/actuation nasal spray  Commonly known as: FLONASE  daily as needed.     folic acid 1 MG tablet  Commonly known as: FOLVITE  Take 1 mg by mouth once daily.     furosemide 40 MG tablet  Commonly known as: LASIX  Take 40 mg by mouth 2 (two) times daily.     gabapentin 800 MG tablet  Commonly known as: NEURONTIN  Take 800 mg by mouth 3 (three) times daily.     mesalamine 1.2 gram Tbec  Commonly known as: LIALDA  Take 1.2 g by mouth daily with breakfast.     pantoprazole 40 MG tablet  Commonly known as: PROTONIX  Take 40 mg by mouth once daily.     potassium chloride SA 20 MEQ tablet  Commonly known as: K-DUR,KLOR-CON  Take 20 mEq by mouth 2 (two) times daily.     pravastatin 80 MG tablet  Commonly known as: PRAVACHOL  Take 80 mg by mouth once daily.     VITAMIN B COMP WITH VIT C NO.6 ORAL  Take by mouth once daily.        STOP taking these  medications    HYDROcodone-acetaminophen 5-325 mg per tablet  Commonly known as: NORCO            Indwelling Lines/Drains at time of discharge:   Lines/Drains/Airways     None                 Time spent on the discharge of patient: 40 minutes         Cindi Carter MD  Department of Hospital Medicine  [facility]

## 2023-08-02 NOTE — SUBJECTIVE & OBJECTIVE
Interval History:  Status post robotic cholecystectomy, pain well controlled, tolerating diet    Medications:  Continuous Infusions:  Scheduled Meds:   allopurinoL  300 mg Oral QAM    amiodarone  200 mg Oral Daily    amLODIPine  2.5 mg Oral Daily    apixaban  5 mg Oral BID    aspirin  81 mg Oral Daily    carvediloL  25 mg Oral BID WM    ezetimibe  10 mg Oral Daily    folic acid  1 mg Oral Daily    furosemide  40 mg Oral BID    gabapentin  800 mg Oral TID    lisinopriL  20 mg Oral Daily    pantoprazole  40 mg Oral Daily    piperacillin-tazobactam (Zosyn) IV (PEDS and ADULTS) (extended infusion is not appropriate)  4.5 g Intravenous Q8H     PRN Meds:acetaminophen, albuterol-ipratropium, dextrose 10%, dextrose 10%, diphenhydrAMINE, glucagon (human recombinant), glucose, glucose, HYDROcodone-acetaminophen, melatonin, morphine, naloxone, ondansetron, prochlorperazine, sodium chloride 0.9%     Review of patient's allergies indicates:   Allergen Reactions    Infliximab Anaphylaxis    Iodine and iodide containing products Rash     Patient states severe rash similar to a sunburn experienced after CT contrast. Patient with prior history of pre-meds before any contrast studies.      Mercaptopurine analogues (thiopurines)      Objective:     Vital Signs (Most Recent):  Temp: 98.1 °F (36.7 °C) (08/02/23 0721)  Pulse: 75 (08/02/23 0744)  Resp: 16 (08/02/23 0744)  BP: (!) 167/84 (08/02/23 0721)  SpO2: 96 % (08/02/23 0744) Vital Signs (24h Range):  Temp:  [97.2 °F (36.2 °C)-98.9 °F (37.2 °C)] 98.1 °F (36.7 °C)  Pulse:  [] 75  Resp:  [14-24] 16  SpO2:  [90 %-99 %] 96 %  BP: (125-167)/(68-92) 167/84     Weight: 93.5 kg (206 lb 2.1 oz)  Body mass index is 28.35 kg/m².    Intake/Output - Last 3 Shifts         07/31 0700  08/01 0659 08/01 0700  08/02 0659 08/02 0700  08/03 0659    P.O. 50 120     I.V. (mL/kg) 891.6 (9.4) 1743 (18.6)     IV Piggyback 106.8 1489.3     Total Intake(mL/kg) 1048.4 (11) 3352.3 (35.9)     Blood  15      Total Output  15     Net +1048.4 +3337.3            Urine Occurrence 3 x 4 x              Physical Exam  Vitals reviewed.   Constitutional:       Appearance: He is well-developed.   HENT:      Head: Normocephalic and atraumatic.   Cardiovascular:      Rate and Rhythm: Normal rate and regular rhythm.   Pulmonary:      Effort: Pulmonary effort is normal.      Breath sounds: Normal breath sounds.   Abdominal:      General: Bowel sounds are normal. There is no distension.      Palpations: Abdomen is soft.      Tenderness: There is no abdominal tenderness.      Comments: Incisions clean dry and intact   Musculoskeletal:      Cervical back: Normal range of motion and neck supple.   Skin:     General: Skin is warm and dry.   Neurological:      Mental Status: He is alert and oriented to person, place, and time.          Significant Labs:  I have reviewed all pertinent lab results within the past 24 hours.  CBC:   Recent Labs   Lab 08/02/23  0628   WBC 7.96   RBC 4.15*   HGB 12.0*   HCT 38.2*      MCV 92   MCH 28.9   MCHC 31.4*       CMP:   Recent Labs   Lab 08/02/23  0628   *   CALCIUM 9.6   ALBUMIN 3.1*   PROT 6.7      K 4.3   CO2 28      BUN 14   CREATININE 1.1   ALKPHOS 199*   ALT 34   AST 19   BILITOT 0.6         Significant Diagnostics:

## 2023-08-02 NOTE — PLAN OF CARE
Pt provided with discharge paperwork. Pt verbalized understanding of discharge instructions, home medications, and follow up appointments. IV and Tele Monitor removed. Pt awaiting transport for discharge to home.

## 2023-08-02 NOTE — PLAN OF CARE
Pt refused bed alarm. Education provided. VSS. Pt walks ind. To bathroom. Pt reports minimal to no pain. IVF. IV ABX. Pt reports passing flatus.       Problem: Adult Inpatient Plan of Care  Goal: Plan of Care Review  Outcome: Ongoing, Progressing  Goal: Patient-Specific Goal (Individualized)  Outcome: Ongoing, Progressing  Goal: Absence of Hospital-Acquired Illness or Injury  Outcome: Ongoing, Progressing  Goal: Optimal Comfort and Wellbeing  Outcome: Ongoing, Progressing  Goal: Readiness for Transition of Care  Outcome: Ongoing, Progressing     Problem: Infection  Goal: Absence of Infection Signs and Symptoms  Outcome: Ongoing, Progressing     Problem: Pain Acute  Goal: Acceptable Pain Control and Functional Ability  Outcome: Ongoing, Progressing

## 2023-08-02 NOTE — ASSESSMENT & PLAN NOTE
Patient unable to undergo MRCP due to pacemaker and has contrast allergy  Could require EUS or ERCP if LFTs remain elevated post surgery

## 2023-08-03 ENCOUNTER — PATIENT OUTREACH (OUTPATIENT)
Dept: ADMINISTRATIVE | Facility: CLINIC | Age: 73
End: 2023-08-03
Payer: COMMERCIAL

## 2023-08-03 NOTE — PROGRESS NOTES
C3 nurse attempted to contact Memo Lew for a TCC post hospital discharge follow up call. No answer. The patient does not have a scheduled HOSFU appointment, and the pt does not have an [facility] PCP.

## 2023-08-08 ENCOUNTER — TELEPHONE (OUTPATIENT)
Dept: SURGERY | Facility: CLINIC | Age: 73
End: 2023-08-08
Payer: COMMERCIAL

## 2023-08-08 NOTE — TELEPHONE ENCOUNTER
----- Message from Fifi Peterson sent at 8/8/2023  9:53 AM CDT -----  Contact: self  Pt is asking for an return call in reference to questions he has concerning his bladder ,please call back at .[phone]  Thx CJ

## 2023-08-10 LAB
COMMENT: NORMAL
FINAL PATHOLOGIC DIAGNOSIS: NORMAL
GROSS: NORMAL
Lab: NORMAL
MICROSCOPIC EXAM: NORMAL

## 2023-08-29 LAB — POCT GLUCOSE: 186 MG/DL (ref 70–110)

## 2024-03-22 NOTE — BRIEF OP NOTE
[facility] -   Brief Operative Note    SUMMARY     Surgery Date: 7/27/2019     Surgeon(s) and Role:     * Jules Clark,  - Primary    Assisting Surgeon: None    Pre-op Diagnosis:  Open dislocation of left elbow [S53.105A, S51.002A]    Post-op Diagnosis:  Post-Op Diagnosis Codes:     * Open dislocation of left elbow [S53.105A, S51.002A]    Procedure(s) (LRB):  INCISION AND DRAINAGE (Left)  ORIF, ELBOW (Left)  DECOMPRESSION, NERVE, ULNAR (Left)    Anesthesia: Choice    Description of Procedure:  Patient is a 68-year-old male who is seen in the emergency room and diagnosed with an open dislocation of his left elbow.  Due to the status of his left elbow informed consent is obtained preoperatively with anticipation of debridement irrigation and open reduction of the elbow.  The patient was explained the risks and benefits of the procedure including the risks of infection, bleeding, stiffness, pain, nerve, artery, vein injury, hardware, hardware failure, fracture, nonunion, malunion, dislocation, subluxation, deep venous thrombosis, pulmonary embolus and death.  Understanding these risks to not be all inclusive the patient has elected to proceed with irrigation debridement and open reduction of the left elbow.    Procedure:  the patient is identified intraoperatively and 2 time-outs were performed once adequate anesthesia is obtained.  The left upper extremity is sterilely prepped and draped in the standard fashion for elbow surgery. Once the time-outs were completed identifying the operative site and patient an incision is made superiorly and inferiorly from the prior laceration exposing the medial aspect of the humerus.  The medial epicondyle as well as the articular surface of the distal humerus is well visualized in the incision. Blunt dissection is utilized once the skin is incised to expose the elbow capsule.  Digital palpation reveals the olecranon and radial head in the lateral dislocation  pattern. At this point 6 L of normal saline solution with an additional 3 L of normal saline containing antibiotics is irrigated through the elbow entirely.  All clots are evacuated at this time.  Necrotic tissue was sharply debrided at the level of the skin, subcutaneous tissue, muscular layer, fascial layer and bone sharply with a scalpel.  The initial laceration measures approximately 4 cm in length. Once adequate debridement of all necrotic tissue and irrigation is completed an open reduction maneuver is carried our reducing the humeral ulnar and humeral radial joints. Intraoperative x-rays were then obtained demonstrating a concentric reduction.  The patient is carried through a full range of motion from full extension to full flexion with no subluxation or dislocation throughout flexion and extension. Valgus stress yields subluxation of the joint consistent with disruption of the medial stabilizing structures.    The medial retinaculum and ligamentous complexes sewn back to the soft tissue remaining on the epicondyle and the common flexor tendon is sewn back to the medial epicondyle.  This is performed with 0 Vicryl in interrupted figure-of-eight sutures. The fascia was then loosely approximated.  Once this is completed blunt dissection down to the level of the ulnar nerve was identified. The ulnar nerve is seen to be within its sheath which has been completely disrupted from the medial aspect of the humerus.  The sheath is opened and the ulnar nerve is examined in its entire course and appears intact. The ulnar nerve was fully decompressed from its superior aspect in the cubital tunnel to distally. Once this was adequately done the subcutaneous tissue was loosely approximated with 0 Vicryl in an interrupted figure-of-eight stitch.  Two 0 Vicryl was then utilized to approximate the superficial subcutaneous tissues. Final skin closure was gained with a 2 0 nylon in a horizontal mattress suture. A sterile  dressing of 4x4s ABDs and cast padding is placed. The patient is then placed into a Ortho Glass splint with both medial and lateral struts for additional support.  During x-ray examination the patient was seen to be most stable in full supination and therefore the patient is casted in approximately 80° of flexion and full supination.  In this position pulses were brisk and full and the patient had excellent capillary refill.  The forearm compartments remain soft and compressible throughout the procedure and there was no sign of compartment syndrome in either the upper arm or forearm compartments.    Description of the findings of the procedure:  Open dislocation left elbow    Estimated Blood Loss:  150 cc No values recorded between 7/27/2019  2:34 PM and 7/27/2019  4:03 PM *         Specimens:   Specimen (12h ago, onward)    None         I do not need any legal help

## (undated) DEVICE — SYR 10CC LUER LOCK

## (undated) DEVICE — GLOVE BIOGEL PI ORTHO PRO SZ7

## (undated) DEVICE — COVER LIGHT HANDLE 80/CA

## (undated) DEVICE — SEE MEDLINE ITEM 152522

## (undated) DEVICE — SEE MEDLINE ITEM 146308

## (undated) DEVICE — SOL NACL IRR 1000ML BTL

## (undated) DEVICE — DRAPE COLUMN DAVINCI XI

## (undated) DEVICE — SEE MEDLINE ITEM 157117

## (undated) DEVICE — OBTURATOR BLADELESS 8MM XI CLR

## (undated) DEVICE — ELECTRODE REM PLYHSV RETURN 9

## (undated) DEVICE — IRRIGATOR ENDOWRIST XI SUCTION

## (undated) DEVICE — TOWEL OR DISP STRL BLUE 4/PK

## (undated) DEVICE — KIT ANTIFOG W/SPONG & FLUID

## (undated) DEVICE — SEE MEDLINE ITEM 157131

## (undated) DEVICE — STOCKINET 4INX48

## (undated) DEVICE — SEE MEDLINE ITEM 152622

## (undated) DEVICE — GLOVE SURGICAL LATEX SZ 7

## (undated) DEVICE — APPLICATOR CHLORAPREP ORN 26ML

## (undated) DEVICE — DRAPE ABDOMINAL TIBURON 14X11

## (undated) DEVICE — SOL NORMAL USPCA 0.9%

## (undated) DEVICE — UNDERGLOVES BIOGEL PI SZ 7 LF

## (undated) DEVICE — SOL NS 1000CC

## (undated) DEVICE — DRESSING XEROFORM FOIL PK 1X8

## (undated) DEVICE — PAD CAST SPECIALIST STRL 3

## (undated) DEVICE — GOWN POLY REINF BRTH SLV XL

## (undated) DEVICE — SUPPORT ULNA NERVE PROTECTOR

## (undated) DEVICE — SET PNEUMOCLEAR HEAT HUM SE HF

## (undated) DEVICE — DRAPE MOBILE C-ARM

## (undated) DEVICE — NDL SAFETY 22G X 1.5 ECLIPSE

## (undated) DEVICE — SUT PDS II O CT-2 VIL MONO

## (undated) DEVICE — SUT VICRYL 3-0 OB 36 CT-1

## (undated) DEVICE — HEADREST ROUND DISP FOAM 9IN

## (undated) DEVICE — MANIFOLD 4 PORT

## (undated) DEVICE — DEVICE CLOSURE DISP 14G

## (undated) DEVICE — PAD CAST SPECIALIST STRL 4

## (undated) DEVICE — COVER OVERHEAD SURG LT BLUE

## (undated) DEVICE — DECANTER 6 VIAL

## (undated) DEVICE — ADHESIVE DERMABOND ADVANCED

## (undated) DEVICE — DRAPE THREE-QTR REINF 53X77IN

## (undated) DEVICE — SUT VICRYL ANTIMICRO VIL BR

## (undated) DEVICE — DRAPE ARM DAVINCI XI

## (undated) DEVICE — KIT IRR SUCTION HND PIECE

## (undated) DEVICE — PACK BASIC SETUP SC BR

## (undated) DEVICE — STAPLER SKIN PROXIMATE WIDE

## (undated) DEVICE — SEALER LIGASURE LAP 37CM 5MM

## (undated) DEVICE — SUT 4-0 ETHILON 18 PS-2

## (undated) DEVICE — CLIP HEMO-LOK ML

## (undated) DEVICE — BANDAGE ELASTIC 3X5 VELCRO ST

## (undated) DEVICE — SOL STRL WATER INJ 1000ML BG

## (undated) DEVICE — NDL SAFETY 25G X 1.5 ECLIPSE

## (undated) DEVICE — COVER TIP CURVED SCISSORS XI

## (undated) DEVICE — SUT VICRYL 2-0 CT-1 VCP345H

## (undated) DEVICE — SEAL UNIVERSAL 5MM-8MM XI

## (undated) DEVICE — BAG TISSUE RETRIEVAL 5MM

## (undated) DEVICE — SUT MONOCRYL 4.0 PS2 CP496G

## (undated) DEVICE — SYR 3CC LUER LOC

## (undated) DEVICE — GAUZE SPONGE 4X4 12PLY

## (undated) DEVICE — SEE MEDLINE ITEM 146298